# Patient Record
Sex: MALE | Race: WHITE | NOT HISPANIC OR LATINO | Employment: FULL TIME | URBAN - METROPOLITAN AREA
[De-identification: names, ages, dates, MRNs, and addresses within clinical notes are randomized per-mention and may not be internally consistent; named-entity substitution may affect disease eponyms.]

---

## 2023-01-16 PROBLEM — L98.9 LESION OF SKIN OF FACE: Status: ACTIVE | Noted: 2023-01-16

## 2023-01-16 PROBLEM — E66.3 OVERWEIGHT WITH BODY MASS INDEX (BMI) OF 28 TO 28.9 IN ADULT: Status: ACTIVE | Noted: 2023-01-16

## 2023-01-16 PROBLEM — I25.10 CAD (CORONARY ARTERY DISEASE): Status: ACTIVE | Noted: 2023-01-16

## 2023-01-16 PROBLEM — K62.89 PROCTALGIA: Status: ACTIVE | Noted: 2023-01-16

## 2023-01-16 PROBLEM — R93.1 ELEVATED CORONARY ARTERY CALCIUM SCORE: Status: ACTIVE | Noted: 2023-01-16

## 2023-01-16 PROBLEM — S80.852A: Status: ACTIVE | Noted: 2023-01-16

## 2023-01-16 PROBLEM — E78.5 HYPERLIPIDEMIA: Status: ACTIVE | Noted: 2023-01-16

## 2023-01-16 PROBLEM — K60.2 ANAL FISSURE: Status: ACTIVE | Noted: 2023-01-16

## 2023-01-16 RX ORDER — ROSUVASTATIN CALCIUM 40 MG/1
40 TABLET, COATED ORAL DAILY
COMMUNITY
End: 2023-11-27 | Stop reason: SDUPTHER

## 2023-01-16 RX ORDER — ASPIRIN 81 MG/1
81 TABLET ORAL DAILY
COMMUNITY
End: 2023-11-27 | Stop reason: SDUPTHER

## 2023-01-16 RX ORDER — EZETIMIBE 10 MG/1
10 TABLET ORAL DAILY
COMMUNITY
End: 2023-11-27 | Stop reason: SDUPTHER

## 2023-02-21 ENCOUNTER — TELEPHONE (OUTPATIENT)
Dept: PRIMARY CARE | Facility: CLINIC | Age: 58
End: 2023-02-21
Payer: COMMERCIAL

## 2023-03-06 ENCOUNTER — LAB (OUTPATIENT)
Dept: LAB | Facility: LAB | Age: 58
End: 2023-03-06
Payer: COMMERCIAL

## 2023-03-06 ENCOUNTER — OFFICE VISIT (OUTPATIENT)
Dept: PRIMARY CARE | Facility: CLINIC | Age: 58
End: 2023-03-06
Payer: COMMERCIAL

## 2023-03-06 VITALS
SYSTOLIC BLOOD PRESSURE: 138 MMHG | OXYGEN SATURATION: 98 % | WEIGHT: 195.6 LBS | HEIGHT: 70 IN | TEMPERATURE: 97.8 F | DIASTOLIC BLOOD PRESSURE: 70 MMHG | HEART RATE: 67 BPM | BODY MASS INDEX: 28 KG/M2

## 2023-03-06 DIAGNOSIS — Z00.00 WELL ADULT EXAM: Primary | ICD-10-CM

## 2023-03-06 DIAGNOSIS — E66.3 OVERWEIGHT WITH BODY MASS INDEX (BMI) OF 28 TO 28.9 IN ADULT: ICD-10-CM

## 2023-03-06 DIAGNOSIS — E78.41 ELEVATED LIPOPROTEIN(A): ICD-10-CM

## 2023-03-06 DIAGNOSIS — I25.119 CORONARY ARTERY DISEASE WITH ANGINA PECTORIS, UNSPECIFIED VESSEL OR LESION TYPE, UNSPECIFIED WHETHER NATIVE OR TRANSPLANTED HEART (CMS-HCC): ICD-10-CM

## 2023-03-06 DIAGNOSIS — Z00.00 WELL ADULT EXAM: ICD-10-CM

## 2023-03-06 LAB
ALANINE AMINOTRANSFERASE (SGPT) (U/L) IN SER/PLAS: 31 U/L (ref 10–52)
ALBUMIN (G/DL) IN SER/PLAS: 4.5 G/DL (ref 3.4–5)
ALKALINE PHOSPHATASE (U/L) IN SER/PLAS: 44 U/L (ref 33–120)
ANION GAP IN SER/PLAS: 11 MMOL/L (ref 10–20)
ASPARTATE AMINOTRANSFERASE (SGOT) (U/L) IN SER/PLAS: 23 U/L (ref 9–39)
BASOPHILS (10*3/UL) IN BLOOD BY AUTOMATED COUNT: 0.02 X10E9/L (ref 0–0.1)
BASOPHILS/100 LEUKOCYTES IN BLOOD BY AUTOMATED COUNT: 0.3 % (ref 0–2)
BILIRUBIN TOTAL (MG/DL) IN SER/PLAS: 0.7 MG/DL (ref 0–1.2)
CALCIUM (MG/DL) IN SER/PLAS: 9.8 MG/DL (ref 8.6–10.6)
CARBON DIOXIDE, TOTAL (MMOL/L) IN SER/PLAS: 29 MMOL/L (ref 21–32)
CHLORIDE (MMOL/L) IN SER/PLAS: 102 MMOL/L (ref 98–107)
CHOLESTEROL (MG/DL) IN SER/PLAS: 149 MG/DL (ref 0–199)
CHOLESTEROL IN HDL (MG/DL) IN SER/PLAS: 48.3 MG/DL
CHOLESTEROL/HDL RATIO: 3.1
CREATININE (MG/DL) IN SER/PLAS: 0.87 MG/DL (ref 0.5–1.3)
EOSINOPHILS (10*3/UL) IN BLOOD BY AUTOMATED COUNT: 0.08 X10E9/L (ref 0–0.7)
EOSINOPHILS/100 LEUKOCYTES IN BLOOD BY AUTOMATED COUNT: 1.2 % (ref 0–6)
ERYTHROCYTE DISTRIBUTION WIDTH (RATIO) BY AUTOMATED COUNT: 12.8 % (ref 11.5–14.5)
ERYTHROCYTE MEAN CORPUSCULAR HEMOGLOBIN CONCENTRATION (G/DL) BY AUTOMATED: 32.1 G/DL (ref 32–36)
ERYTHROCYTE MEAN CORPUSCULAR VOLUME (FL) BY AUTOMATED COUNT: 89 FL (ref 80–100)
ERYTHROCYTES (10*6/UL) IN BLOOD BY AUTOMATED COUNT: 4.8 X10E12/L (ref 4.5–5.9)
GFR MALE: >90 ML/MIN/1.73M2
GLUCOSE (MG/DL) IN SER/PLAS: 115 MG/DL (ref 74–99)
HEMATOCRIT (%) IN BLOOD BY AUTOMATED COUNT: 42.7 % (ref 41–52)
HEMOGLOBIN (G/DL) IN BLOOD: 13.7 G/DL (ref 13.5–17.5)
IMMATURE GRANULOCYTES/100 LEUKOCYTES IN BLOOD BY AUTOMATED COUNT: 0.3 % (ref 0–0.9)
LDL: 66 MG/DL (ref 0–99)
LEUKOCYTES (10*3/UL) IN BLOOD BY AUTOMATED COUNT: 6.5 X10E9/L (ref 4.4–11.3)
LYMPHOCYTES (10*3/UL) IN BLOOD BY AUTOMATED COUNT: 2.51 X10E9/L (ref 1.2–4.8)
LYMPHOCYTES/100 LEUKOCYTES IN BLOOD BY AUTOMATED COUNT: 38.9 % (ref 13–44)
MONOCYTES (10*3/UL) IN BLOOD BY AUTOMATED COUNT: 0.53 X10E9/L (ref 0.1–1)
MONOCYTES/100 LEUKOCYTES IN BLOOD BY AUTOMATED COUNT: 8.2 % (ref 2–10)
NEUTROPHILS (10*3/UL) IN BLOOD BY AUTOMATED COUNT: 3.29 X10E9/L (ref 1.2–7.7)
NEUTROPHILS/100 LEUKOCYTES IN BLOOD BY AUTOMATED COUNT: 51.1 % (ref 40–80)
NRBC (PER 100 WBCS) BY AUTOMATED COUNT: 0 /100 WBC (ref 0–0)
PLATELETS (10*3/UL) IN BLOOD AUTOMATED COUNT: 218 X10E9/L (ref 150–450)
POTASSIUM (MMOL/L) IN SER/PLAS: 4.3 MMOL/L (ref 3.5–5.3)
PROSTATE SPECIFIC AG (NG/ML) IN SER/PLAS: 3.93 NG/ML (ref 0–4)
PROTEIN TOTAL: 6.6 G/DL (ref 6.4–8.2)
SODIUM (MMOL/L) IN SER/PLAS: 138 MMOL/L (ref 136–145)
TRIGLYCERIDE (MG/DL) IN SER/PLAS: 172 MG/DL (ref 0–149)
UREA NITROGEN (MG/DL) IN SER/PLAS: 16 MG/DL (ref 6–23)
VLDL: 34 MG/DL (ref 0–40)

## 2023-03-06 PROCEDURE — 99396 PREV VISIT EST AGE 40-64: CPT | Performed by: FAMILY MEDICINE

## 2023-03-06 PROCEDURE — 85025 COMPLETE CBC W/AUTO DIFF WBC: CPT

## 2023-03-06 PROCEDURE — 84153 ASSAY OF PSA TOTAL: CPT

## 2023-03-06 PROCEDURE — 80053 COMPREHEN METABOLIC PANEL: CPT

## 2023-03-06 PROCEDURE — 3008F BODY MASS INDEX DOCD: CPT | Performed by: FAMILY MEDICINE

## 2023-03-06 PROCEDURE — 80061 LIPID PANEL: CPT

## 2023-03-06 PROCEDURE — 36415 COLL VENOUS BLD VENIPUNCTURE: CPT

## 2023-03-06 ASSESSMENT — ENCOUNTER SYMPTOMS
PSYCHIATRIC NEGATIVE: 1
CARDIOVASCULAR NEGATIVE: 1
RESPIRATORY NEGATIVE: 1
CONSTITUTIONAL NEGATIVE: 1
EYES NEGATIVE: 1
ENDOCRINE NEGATIVE: 1
NEUROLOGICAL NEGATIVE: 1
HEMATOLOGIC/LYMPHATIC NEGATIVE: 1
GASTROINTESTINAL NEGATIVE: 1

## 2023-03-06 ASSESSMENT — PAIN SCALES - GENERAL: PAINLEVEL: 0-NO PAIN

## 2023-03-06 NOTE — PROGRESS NOTES
"Subjective   Patient ID: Rajiv Melvin is a 57 y.o. male who presents for No chief complaint on file..    HPI     Review of Systems   Constitutional: Negative.    HENT: Negative.     Eyes: Negative.    Respiratory: Negative.     Cardiovascular: Negative.         Dr Chacon cardiac   Gastrointestinal: Negative.    Endocrine: Negative.    Genitourinary: Negative.    Skin: Negative.    Neurological: Negative.    Hematological: Negative.    Psychiatric/Behavioral: Negative.         Objective   /70 (BP Location: Left arm)   Pulse 67   Temp 36.6 °C (97.8 °F) (Temporal)   Ht 1.778 m (5' 10\")   Wt 88.7 kg (195 lb 9.6 oz)   SpO2 98%   BMI 28.07 kg/m²     Physical Exam  HENT:      Right Ear: Tympanic membrane normal.      Left Ear: Tympanic membrane normal.   Eyes:      Pupils: Pupils are equal, round, and reactive to light.   Cardiovascular:      Rate and Rhythm: Normal rate.   Pulmonary:      Effort: Pulmonary effort is normal.      Breath sounds: Normal breath sounds.   Abdominal:      General: Abdomen is flat.      Palpations: Abdomen is soft.   Genitourinary:     Prostate: Normal.   Musculoskeletal:         General: Normal range of motion.      Cervical back: Normal range of motion.   Skin:     General: Skin is warm.   Neurological:      General: No focal deficit present.      Mental Status: He is alert.   Psychiatric:         Mood and Affect: Mood normal.         Assessment/Plan   Problem List Items Addressed This Visit          Circulatory    CAD (coronary artery disease)       Endocrine/Metabolic    Overweight with body mass index (BMI) of 28 to 28.9 in adult       Other    Hyperlipidemia     Other Visit Diagnoses       Well adult exam    -  Primary    Relevant Orders    Comprehensive Metabolic Panel    CBC and Auto Differential    Lipid Panel    PSA               "

## 2023-09-20 ENCOUNTER — OFFICE VISIT (OUTPATIENT)
Dept: PRIMARY CARE | Facility: CLINIC | Age: 58
End: 2023-09-20
Payer: COMMERCIAL

## 2023-09-20 VITALS
DIASTOLIC BLOOD PRESSURE: 78 MMHG | OXYGEN SATURATION: 98 % | WEIGHT: 194.8 LBS | SYSTOLIC BLOOD PRESSURE: 122 MMHG | HEART RATE: 64 BPM | BODY MASS INDEX: 27.95 KG/M2

## 2023-09-20 DIAGNOSIS — M25.511 CHRONIC RIGHT SHOULDER PAIN: Primary | ICD-10-CM

## 2023-09-20 DIAGNOSIS — G89.29 CHRONIC RIGHT SHOULDER PAIN: Primary | ICD-10-CM

## 2023-09-20 PROBLEM — R94.39 ABNORMAL STRESS TEST: Status: ACTIVE | Noted: 2023-09-20

## 2023-09-20 PROCEDURE — 99213 OFFICE O/P EST LOW 20 MIN: CPT | Performed by: STUDENT IN AN ORGANIZED HEALTH CARE EDUCATION/TRAINING PROGRAM

## 2023-09-20 PROCEDURE — 3008F BODY MASS INDEX DOCD: CPT | Performed by: STUDENT IN AN ORGANIZED HEALTH CARE EDUCATION/TRAINING PROGRAM

## 2023-09-20 PROCEDURE — 1036F TOBACCO NON-USER: CPT | Performed by: STUDENT IN AN ORGANIZED HEALTH CARE EDUCATION/TRAINING PROGRAM

## 2023-09-20 ASSESSMENT — ENCOUNTER SYMPTOMS: DEPRESSION: 0

## 2023-09-20 ASSESSMENT — PAIN SCALES - GENERAL: PAINLEVEL: 0-NO PAIN

## 2023-09-20 NOTE — PROGRESS NOTES
Subjective   Patient ID: Rajiv Melvin is a 58 y.o. male who presents for Shoulder Pain (Right shoulder pain.).    HPI comes in discuss chronic right shoulder pain worse over the past year    Review of Systems  Constitutional: NO F, chills, or sweats  Eyes: no blurred vision or visual disturbance  ENT: no hearing loss, no congestion, no nasal discharge, no hoarseness and no sore throat.   Cardiovascular: no chest pain, no edema, no palps and no syncope.   Respiratory: no cough,no s.o.b. and no wheezing  Gastrointestinal: no abdominal pain, No C/D no N/V, no blood in stools  Genitourinary: no dysuria, no change in urinary frequency, no urinary hesitancy and no feelings of urinary urgency.   Musculoskeletal: Chronic right shoulder pain  Objective   /78 (BP Location: Right arm, Patient Position: Sitting, BP Cuff Size: Large adult)   Pulse 64   Wt 88.4 kg (194 lb 12.8 oz)   SpO2 98%   BMI 27.95 kg/m²     Physical Exam  Shoulder-pain with apprehension test of the right shoulder otherwise full active passive range of motion positive mild crepitus  Assessment/Plan     1.  Chronic right shoulder pain slowly worsening in recent years.  Exam is consistent with an anterior labral tear.  Would advise on conservative management first, get a baseline x-ray.  Start physical therapy.  If no improvement with physical therapy over the next several weeks we would consider MRI or orthopedic referral.

## 2023-10-09 ENCOUNTER — EVALUATION (OUTPATIENT)
Dept: PHYSICAL THERAPY | Facility: CLINIC | Age: 58
End: 2023-10-09
Payer: COMMERCIAL

## 2023-10-09 DIAGNOSIS — G89.29 CHRONIC RIGHT SHOULDER PAIN: ICD-10-CM

## 2023-10-09 DIAGNOSIS — M25.511 CHRONIC RIGHT SHOULDER PAIN: ICD-10-CM

## 2023-10-09 PROCEDURE — 97161 PT EVAL LOW COMPLEX 20 MIN: CPT | Mod: GP | Performed by: PHYSICAL THERAPIST

## 2023-10-09 PROCEDURE — 97110 THERAPEUTIC EXERCISES: CPT | Mod: GP | Performed by: PHYSICAL THERAPIST

## 2023-10-09 ASSESSMENT — ENCOUNTER SYMPTOMS
LOSS OF SENSATION IN FEET: 0
DEPRESSION: 0
OCCASIONAL FEELINGS OF UNSTEADINESS: 0

## 2023-10-09 ASSESSMENT — PATIENT HEALTH QUESTIONNAIRE - PHQ9
SUM OF ALL RESPONSES TO PHQ9 QUESTIONS 1 AND 2: 0
2. FEELING DOWN, DEPRESSED OR HOPELESS: NOT AT ALL
1. LITTLE INTEREST OR PLEASURE IN DOING THINGS: NOT AT ALL

## 2023-10-09 NOTE — LETTER
October 9, 2023     Patient: Rajiv Melvin   YOB: 1965   Date of Visit: 10/9/2023       To Whom It May Concern:    It is my medical opinion that Rajiv Melvin {Work release (duty restriction):39523}.    If you have any questions or concerns, please don't hesitate to call.         Sincerely,        Micaela Mclaughlin, PT    CC: No Recipients

## 2023-10-09 NOTE — LETTER
October 9, 2023     Patient: Rajiv Melvin   YOB: 1965   Date of Visit: 10/9/2023       To Whom it May Concern:    Rajiv Melvin was seen in my clinic on 10/9/2023. He {Return to school/sport:52116}.    If you have any questions or concerns, please don't hesitate to call.         Sincerely,          Micaela Mclaughlin, PT        CC: No Recipients

## 2023-10-09 NOTE — PROGRESS NOTES
Physical Therapy    Physical Therapy Evaluation and Treatment      Patient Name: Rajiv Melvin  MRN: [id number]  Today's Date: 10/9/2023  Time Calculation  Start Time: 1430  Stop Time: 1515  Time Calculation (min): 45 min          Current Problem:   1. Chronic right shoulder pain  Referral to Physical Therapy    Follow Up In Physical Therapy          Referring provider: Dr. Alden Funes    Insurance:  Visit #: 1  Approved number of visits: ?  Auth Required: NO      Precautions: NONE    Assessment: Pt is 58 y.o. male c/o insidious onset right shoulder pain for over one year which is getting progressively worse and interfering with patients functional movement and quality of sleep. Pt demonstrates decreased right shoulder A/PROM, decreased right shoulder and scapular stabilizer strength with painful MMT, round shoulder posture, and painful end ranges all passive and active ROM right shoulder.  Signs and symptoms consistent with referring diagnosis with possible subacromial impingement.  Pt is a good candidate for skilled PT intervention to address above impairments and improve functional mobility and QOL.     Treatment today:   1. Initial evaluation   2. Pt ed on diagnosis, prognosis, goals of PT, expectations   3. HEP (see below) ed on normal vs abnormal response, education on holding off on push ups, DB flies, pull ups, etc.     Access Code: [email or web address]  URL: [email or web address]  Date: 10/09/2023  Prepared by: Micaela Mclaughlin    Exercises  - Supine Shoulder Flexion AAROM with Dowel  - 2 x daily - 7 x weekly - 2 sets - 10 reps  - Supine Shoulder External Rotation in 45 Degrees Abduction AAROM with Dowel  - 2 x daily - 7 x weekly - 2 sets - 10 reps  - Standing Bilateral Low Shoulder Row with Anchored Resistance  - 2 x daily - 7 x weekly - 2 sets - 10 reps  - Shoulder Extension with Resistance  - 2 x daily - 7 x weekly - 2 sets - 10 reps  - Shoulder External Rotation with Resistance  - 2 x daily - 7 x  weekly - 2 sets - 10 reps    Plan:  Interventions: Biofeedback, Cryotherapy, Dry Needling, Education/Instruction, Electrical Stimulation, Home Program, Hot Pack, Kinesiotaping, Manual Therapy, Neuromuscular Re-education, Self Care/Home Management, Therapeutic Exercises, Ultrasound, Mechanical Traction, Aquatic Therapy, Vasopneumatic device with cold  Frequency and Duration: 1-2xweek for 4-6 weeks   Rehab Potential: Good  Plan of Care Agreement: Patient      Goals:  Pt will meet the following goals in 8 weeks  Pt will report <0-2/10 pain with ADL's and functional mobility.  Pt will be independent with HEP at least 3 days per week to maintain gains made in therapy.   3. Pt will increase right shoulder AROM > 160 degrees flexion and abduction and reach HBB/head equal to left, to ease performance of dressing, ADL's such as putting away dishes, playing sports, etc.  4. Pt will increase right shoulder strength 5/5 without pain to return to full exercise routine, pushing/pulling/lifting needed for work tasks, yard work, etc.  5. QUICK DASH < /= 4.55 demonstrating improved function and decreased disability   6. Pt will sleep full 6 hours without waking due to shoulder pain      Subjective:  Chief complaint: Chronic right shoulder pain which began over a year ago but progressively getting worse.  No specific EMMANUEL.     Relevant PMH: right hand dominant    Pain:  Current: 0  At worst: 5  Makes better: rest but did not relieve pain  Makes worse: lying on right shoulder, reaching out to side, overhead, behind back, throwing  Type: dull achy, really sharp pain with activities  Location: anterior lateral right shoulder    Sleep: cannot sleep on right side    PLOF: sports, working out, ADL's with pain    Work: sales, full time     Exercise: pull ups, free, push ups, treadmill, elliptical. Has not done arm work for the past couple weeks.     Pt goals for PT: relieve pain, return to tennis, volleyball, working  out    Objective:    Posture: Round shoulders, fwd head    Strength (MMT):  *pain upon resitance  Shoulder flexion: R 4*, L 5  Shoulder abduction: R 4*, L 5  Shoulder ER: R 5, L 5  Shoulder IR: R 5, L 5    Prone right shoulder  Low trap 3+/5 painful at end range  Mid trap 4+/5  Shoulder ext 5/5    Range of Motion (degrees of motion):  AROM  Pain with all end range movement right shoulder  Shoulder flexion: R 132, L 180   Shoulder abduction: R 120, L 180  Shoulder ER: R T3, L T6 (reach behind head)  Shoulder IR: R glutes, L T10 (reach behind back)    PROM Right shoulder  Pain at all end ranges  Flexion 135  Abd 140  ER 60  IR 55    Palpation/other:  negative apprehension test, no tenderness to palpation of rotator cuff muscles, biceps tendons, pec minor or major  Decreased right GH joint mobility posterior and inferior    Outcome Measures:  Other Measures  Disability of Arm Shoulder Hand (DASH): 18.18

## 2023-10-20 ENCOUNTER — TREATMENT (OUTPATIENT)
Dept: PHYSICAL THERAPY | Facility: CLINIC | Age: 58
End: 2023-10-20
Payer: COMMERCIAL

## 2023-10-20 ENCOUNTER — APPOINTMENT (OUTPATIENT)
Dept: PHYSICAL THERAPY | Facility: CLINIC | Age: 58
End: 2023-10-20
Payer: COMMERCIAL

## 2023-10-20 DIAGNOSIS — M25.511 RIGHT SHOULDER PAIN: Primary | ICD-10-CM

## 2023-10-20 DIAGNOSIS — M25.511 CHRONIC RIGHT SHOULDER PAIN: ICD-10-CM

## 2023-10-20 DIAGNOSIS — G89.29 CHRONIC RIGHT SHOULDER PAIN: ICD-10-CM

## 2023-10-20 PROCEDURE — 97140 MANUAL THERAPY 1/> REGIONS: CPT | Mod: GP

## 2023-10-20 PROCEDURE — 97110 THERAPEUTIC EXERCISES: CPT | Mod: GP

## 2023-10-20 NOTE — PROGRESS NOTES
Physical Therapy    Physical Therapy Treatment    Patient Name: Rajiv Melvin  MRN: [id number]  Today's Date: 10/20/2023  Time Calculation  Start Time: 1413  Stop Time: 1458  Time Calculation (min): 45 min    Visit: 2  Visits Approved: 12  Authorization needed: no  Certification dates: none    Goals:  Pt will meet the following goals in 8 weeks  Pt will report <0-2/10 pain with ADL's and functional mobility.  Pt will be independent with HEP at least 3 days per week to maintain gains made in therapy.   3.    Pt will increase right shoulder AROM > 160 degrees flexion and abduction and reach HBB/head equal to left, to ease performance of dressing, ADL's such as putting away dishes, playing sports, etc.  4.    Pt will increase right shoulder strength 5/5 without pain to return to full exercise routine, pushing/pulling/lifting needed for work tasks, yard work, etc.  5. QUICK DASH < /= 4.55 demonstrating improved function and decreased disability   6. Pt will sleep full 6 hours without waking due to shoulder pain    Assessment:   Pt with increased hypomobility in R scapula. Palpable band of tightness over anterior shoulder with MFR     Plan:   Continue scapular stability, building HEP   Access Code: [email or web address]  URL: [email or web address]  Date: 10/20/2023  Prepared by: Arelis Weldon    Exercises  - Prone Scapular Retraction  - 1 x daily - 7 x weekly - 2 sets - 10 reps - 5 seconds  hold  - Quadruped Alternating Arm Lift  - 1 x daily - 7 x weekly - 2 sets - 10 reps - 5 seconds  hold  Current Problem  1. Right shoulder pain        2. Chronic right shoulder pain  Follow Up In Physical Therapy          Subjective   General  Pt reports not much improvement still rated 5/10 with quick velocity movements. He has been taking it easy at the gym.    Precautions  none    Pain   5/10 in RUE     Objective        Outcome Measures:   Disability of Arm Shoulder Hand (DASH): 18.18      Treatments:    Therapeutic Exercise:      UBE x 6 mins, 3 minutes forward and retro  Quadruped shoulder flexion 2 x 10   Child's pose 30 seconds x 30  Prone scapular retraction 5 second hold 3 x 10   Ball on wall CW/CCW x 1 minute x 3   Rhythmic stabilization rotation, supine @ 90, sidelying @ 90  Discussed gym modifications     Manual:  Prone thoracic mobilizations   Sidelying scapular mobilizations RUE  Sidelying TPR and MFR to anterior shoulder RUE      OP EDUCATION:

## 2023-10-24 ENCOUNTER — APPOINTMENT (OUTPATIENT)
Dept: PHYSICAL THERAPY | Facility: CLINIC | Age: 58
End: 2023-10-24
Payer: COMMERCIAL

## 2023-10-25 ENCOUNTER — TREATMENT (OUTPATIENT)
Dept: PHYSICAL THERAPY | Facility: CLINIC | Age: 58
End: 2023-10-25
Payer: COMMERCIAL

## 2023-10-25 DIAGNOSIS — G89.29 CHRONIC RIGHT SHOULDER PAIN: ICD-10-CM

## 2023-10-25 DIAGNOSIS — M25.511 CHRONIC RIGHT SHOULDER PAIN: ICD-10-CM

## 2023-10-25 PROCEDURE — 97140 MANUAL THERAPY 1/> REGIONS: CPT | Mod: GP

## 2023-10-25 PROCEDURE — 97110 THERAPEUTIC EXERCISES: CPT | Mod: GP

## 2023-10-25 NOTE — PROGRESS NOTES
"Physical Therapy    Physical Therapy Treatment    Patient Name: Rajiv Melvin  MRN: [id number]  Today's Date: 10/25/2023  Time Calculation  Start Time: 1500  Stop Time: 1545  Time Calculation (min): 45 min    Visit: 3  Visits Approved: 12  Authorization needed: no  Certification dates: none    Assessment:   Pt challenged this session with noted fatigue throughout session of RTC and scapular stabilizers. HEP was updated to challenge pt and facilitate his active lifestyle. Pt is appropriate for continued skilled PT services to address remaining impairments.    Plan:   Continue with scapular stabilizer and RTC strengthening, progress as tolerated. Consider moving towards discharge.     Current Problem  1. Chronic right shoulder pain  Follow Up In Physical Therapy          Subjective   General  Pt reports no pain currently, but at worst 2-3/10. He has been avoiding quick and painful mvmts. Compliant with HEP.    Precautions  none    Pain   0/10 currently    Objective      Treatments:  Therapeutic Exercise:  UBE x 6 mins, 3 minutes forward and retro  Child's pose at bar 30\" x 3  Ball on wall CW/CCW 1\" x 2 R  B ER with wall slide red TB x 10 x 2  Push up with plus on wall x 10 x 2   3 way shoulder resisted red TB at wall x 10 x 2 R/L  B rows 32.5# x 10 x 2   B face pulls 32.5# x 10 x 2  Arm bar 8# DB x 5 R, 10# DB x 10 x 2 R/L   Quadruped alt shoulder flex on black side of BOSU x 10 x 2 R/L   Plank with shoulder taps x 10 x 2 R/L    Education:  Pt educated on potential labral tear reduces dynamic shoulder joint stability, so strengthening RTC and scapular stabilizers will provide muscular dynamic stability. HEP updated - pt recommended to perform arm bar with kettlebell with weight up for challenge and increase need for shoulder stability.           "

## 2023-10-30 ENCOUNTER — TREATMENT (OUTPATIENT)
Dept: PHYSICAL THERAPY | Facility: CLINIC | Age: 58
End: 2023-10-30
Payer: COMMERCIAL

## 2023-10-30 DIAGNOSIS — M25.511 CHRONIC RIGHT SHOULDER PAIN: ICD-10-CM

## 2023-10-30 DIAGNOSIS — G89.29 CHRONIC RIGHT SHOULDER PAIN: ICD-10-CM

## 2023-10-30 PROCEDURE — 97110 THERAPEUTIC EXERCISES: CPT | Mod: GP | Performed by: PHYSICAL THERAPIST

## 2023-10-30 PROCEDURE — 97112 NEUROMUSCULAR REEDUCATION: CPT | Mod: GP | Performed by: PHYSICAL THERAPIST

## 2023-10-30 NOTE — PROGRESS NOTES
"Physical Therapy    Physical Therapy Treatment    Patient Name: Rajiv Melvin  MRN: [id number]  Today's Date: 10/30/2023  Time Calculation  Start Time: 1600  Stop Time: 1645  Time Calculation (min): 45 min    Visit: 4  Visits Approved: 12  Authorization needed: no  Certification dates: none    Assessment:   Pt challenged this session with noted fatigue throughout session of RTC and scapular stabilizers. Pt c/o increased pain 2/10 during plank hand slides. HEP was reviewed and updated. Pt will work on home program for 3 weeks, then consider discharge depending on sx.   Plan:   Check in on progress in 3 weeks and consider discharge.     Current Problem  1. Chronic right shoulder pain  Follow Up In Physical Therapy    Follow Up In Physical Therapy          Subjective   General  Pt states that his R shoulder pain has decreased and feels he is getting better. Compliant with HEP and challenged with exercises.     Precautions  none    Pain   0/10 currently    Objective      Treatments:  Access code: [email or web address]  Therapeutic Exercise:  UBE x 6 mins, 3 minutes forward and retro - not performed this date   Ball on wall CW/CCW 1\" x 2 R  B ER with wall slide red TB x 10 , green TB x 10   3 way shoulder resisted red TB at wall x 10 x 2 R/L  Unilateral ER at ABD 90 with rotation x 10 x 2 R/L  Prone Y's and T's x 10 x 2   Plank with shoulder taps x 10 x 2 R/L  Plank with hand slide x 10 x 2 R/L   High to low plank x 10 x 2 R/L     NM Re-Ed:  Rhythmic stabilization R shoulder flexed to 90 supine - up/down, side/side, random     Education:  HEP updated.           "

## 2023-11-20 ENCOUNTER — TELEPHONE (OUTPATIENT)
Dept: PRIMARY CARE | Facility: CLINIC | Age: 58
End: 2023-11-20
Payer: COMMERCIAL

## 2023-11-20 ENCOUNTER — APPOINTMENT (OUTPATIENT)
Dept: PHYSICAL THERAPY | Facility: CLINIC | Age: 58
End: 2023-11-20
Payer: COMMERCIAL

## 2023-11-20 DIAGNOSIS — G89.29 CHRONIC RIGHT SHOULDER PAIN: Primary | ICD-10-CM

## 2023-11-20 DIAGNOSIS — M25.511 CHRONIC RIGHT SHOULDER PAIN: Primary | ICD-10-CM

## 2023-11-20 NOTE — TELEPHONE ENCOUNTER
Pt reached out through [employer] for an appt request but this is what the pt said:      Have done PT but still have issues with shoulder.  Can we schedule an MRI?       Does the pt need an appt or can an MRI be placed

## 2023-11-27 DIAGNOSIS — E78.41 ELEVATED LIPOPROTEIN(A): Primary | ICD-10-CM

## 2023-11-27 DIAGNOSIS — I25.119 CORONARY ARTERY DISEASE WITH ANGINA PECTORIS, UNSPECIFIED VESSEL OR LESION TYPE, UNSPECIFIED WHETHER NATIVE OR TRANSPLANTED HEART (CMS-HCC): ICD-10-CM

## 2023-11-27 RX ORDER — ROSUVASTATIN CALCIUM 40 MG/1
40 TABLET, COATED ORAL DAILY
Qty: 90 TABLET | Refills: 1 | Status: SHIPPED | OUTPATIENT
Start: 2023-11-27 | End: 2024-03-08 | Stop reason: SDUPTHER

## 2023-11-27 RX ORDER — EZETIMIBE 10 MG/1
10 TABLET ORAL DAILY
Qty: 90 TABLET | Refills: 1 | Status: SHIPPED | OUTPATIENT
Start: 2023-11-27 | End: 2024-02-27 | Stop reason: SDUPTHER

## 2023-11-27 RX ORDER — ASPIRIN 81 MG/1
81 TABLET ORAL DAILY
Qty: 90 TABLET | Refills: 1 | Status: SHIPPED | OUTPATIENT
Start: 2023-11-27 | End: 2024-03-08 | Stop reason: SDUPTHER

## 2023-12-05 ENCOUNTER — HOSPITAL ENCOUNTER (OUTPATIENT)
Dept: RADIOLOGY | Facility: CLINIC | Age: 58
Discharge: HOME | End: 2023-12-05
Payer: COMMERCIAL

## 2023-12-05 DIAGNOSIS — M25.511 CHRONIC RIGHT SHOULDER PAIN: ICD-10-CM

## 2023-12-05 DIAGNOSIS — G89.29 CHRONIC RIGHT SHOULDER PAIN: Primary | ICD-10-CM

## 2023-12-05 DIAGNOSIS — M25.511 CHRONIC RIGHT SHOULDER PAIN: Primary | ICD-10-CM

## 2023-12-05 DIAGNOSIS — G89.29 CHRONIC RIGHT SHOULDER PAIN: ICD-10-CM

## 2023-12-05 PROCEDURE — 73221 MRI JOINT UPR EXTREM W/O DYE: CPT | Mod: RIGHT SIDE | Performed by: RADIOLOGY

## 2023-12-05 PROCEDURE — 73221 MRI JOINT UPR EXTREM W/O DYE: CPT | Mod: RT

## 2023-12-07 ENCOUNTER — OFFICE VISIT (OUTPATIENT)
Dept: ORTHOPEDIC SURGERY | Facility: CLINIC | Age: 58
End: 2023-12-07
Payer: COMMERCIAL

## 2023-12-07 VITALS — WEIGHT: 190 LBS | BODY MASS INDEX: 27.2 KG/M2 | HEIGHT: 70 IN

## 2023-12-07 DIAGNOSIS — S46.011A ROTATOR CUFF STRAIN, RIGHT, INITIAL ENCOUNTER: ICD-10-CM

## 2023-12-07 DIAGNOSIS — M25.511 CHRONIC RIGHT SHOULDER PAIN: Primary | ICD-10-CM

## 2023-12-07 DIAGNOSIS — G89.29 CHRONIC RIGHT SHOULDER PAIN: Primary | ICD-10-CM

## 2023-12-07 PROCEDURE — 3008F BODY MASS INDEX DOCD: CPT | Performed by: ORTHOPAEDIC SURGERY

## 2023-12-07 PROCEDURE — 99204 OFFICE O/P NEW MOD 45 MIN: CPT | Performed by: ORTHOPAEDIC SURGERY

## 2023-12-07 PROCEDURE — 1036F TOBACCO NON-USER: CPT | Performed by: ORTHOPAEDIC SURGERY

## 2023-12-07 RX ORDER — SODIUM CHLORIDE, SODIUM LACTATE, POTASSIUM CHLORIDE, CALCIUM CHLORIDE 600; 310; 30; 20 MG/100ML; MG/100ML; MG/100ML; MG/100ML
100 INJECTION, SOLUTION INTRAVENOUS CONTINUOUS
Status: CANCELLED | OUTPATIENT
Start: 2024-01-19

## 2023-12-07 NOTE — LETTER
December 7, 2023     Noel Murphy DO  [address]  [address]  [facility] [address]    Patient: Rajiv Melvin   YOB: 1965   Date of Visit: 12/7/2023       Dear Dr. Noel Murphy DO:    Thank you for referring Rajiv Melvin to me for evaluation. Below are my notes for this consultation.  If you have questions, please do not hesitate to call me. I look forward to following your patient along with you.       Sincerely,     Steve Dunlap MD      CC: Alden Funes DO  ______________________________________________________________________________________    58-year-old is seen with right shoulder pain.  He has been having persistent severe sharp shooting pain in the right shoulder over the last 5 months.  He works in industrial sales.  He does repetitive activities with the shoulder over the years.  He has had physical therapy without improvement.  He is right-hand dominant.  He has difficulty with overhead reaching and lifting activities.  Past medical social family history review of systems reviewed and updated on the information sheet.  Pleasant and no acute distress.  Right shoulder forward flexion 160°. No effusion or instability. There is positive Neer and Link impingement. There is subacromial crepitus and tenderness around the subacromial space.  Mild biceps tenderness. No acromioclavicular tenderness. Rotator cuff strength is decreased and there is discomfort with strength testing. Left shoulder forward flexion 180°. No effusion or instability. No impingement or crepitus or tenderness involving the subacromial space. No biceps or acromioclavicular tenderness. There is intact rotator cuff strength. There is adequate range of motion of the cervical spine without pain. Both upper extremities are well perfused, skin is intact and muscle tone is adequate. Elbow flexion and extension and wrist flexion and extension strength are intact.    Multiple x-ray views of the right  shoulder are personally reviewed and there is no acute bony abnormality.    MRI of the right shoulder was personally reviewed and there is a full-thickness tear involving the supraspinatus tendon.  There are chondral changes involving the glenohumeral joint and irregularity involving the labrum.  There is subacromial bursitis and a glenohumeral effusion.    A detailed discussion about the rotator cuff tear was done.  Treatment options including no treatment were reviewed and the decision was made to proceed with a right shoulder arthroscopy with rotator cuff repair and extensive debridement and subacromial decompression.  The surgery and postoperative course were reviewed in detail.  Risks including but not limited to infection thromboembolus neurovascular injury fracture medical problems stiffness and repair not healing were discussed and he understands this and has elected to proceed.  He will avoid aggravating activities in the meantime.

## 2023-12-08 PROBLEM — S46.011A ROTATOR CUFF STRAIN, RIGHT, INITIAL ENCOUNTER: Status: ACTIVE | Noted: 2023-12-07

## 2023-12-08 NOTE — PROGRESS NOTES
58-year-old is seen with right shoulder pain.  He has been having persistent severe sharp shooting pain in the right shoulder over the last 5 months.  He works in industrial sales.  He does repetitive activities with the shoulder over the years.  He has had physical therapy without improvement.  He is right-hand dominant.  He has difficulty with overhead reaching and lifting activities.  Past medical social family history review of systems reviewed and updated on the information sheet.  Pleasant and no acute distress.  Right shoulder forward flexion 160°. No effusion or instability. There is positive Neer and Link impingement. There is subacromial crepitus and tenderness around the subacromial space.  Mild biceps tenderness. No acromioclavicular tenderness. Rotator cuff strength is decreased and there is discomfort with strength testing. Left shoulder forward flexion 180°. No effusion or instability. No impingement or crepitus or tenderness involving the subacromial space. No biceps or acromioclavicular tenderness. There is intact rotator cuff strength. There is adequate range of motion of the cervical spine without pain. Both upper extremities are well perfused, skin is intact and muscle tone is adequate. Elbow flexion and extension and wrist flexion and extension strength are intact.    Multiple x-ray views of the right shoulder are personally reviewed and there is no acute bony abnormality.    MRI of the right shoulder was personally reviewed and there is a full-thickness tear involving the supraspinatus tendon.  There are chondral changes involving the glenohumeral joint and irregularity involving the labrum.  There is subacromial bursitis and a glenohumeral effusion.    A detailed discussion about the rotator cuff tear was done.  Treatment options including no treatment were reviewed and the decision was made to proceed with a right shoulder arthroscopy with rotator cuff repair and extensive debridement and  subacromial decompression.  The surgery and postoperative course were reviewed in detail.  Risks including but not limited to infection thromboembolus neurovascular injury fracture medical problems stiffness and repair not healing were discussed and he understands this and has elected to proceed.  He will avoid aggravating activities in the meantime.

## 2023-12-21 ENCOUNTER — DOCUMENTATION (OUTPATIENT)
Dept: PHYSICAL THERAPY | Facility: CLINIC | Age: 58
End: 2023-12-21
Payer: COMMERCIAL

## 2023-12-21 NOTE — PROGRESS NOTES
Physical Therapy    Discharge Summary    Name: Rajiv Melvin  MRN: [id number]  : 1965  Date: 23    Discharge Summary: PT    Discharge Information: Date of discharge 23, Date of last visit 10/30/23, Date of evaluation 10/9/23, Number of attended visits 4, Referred by Dr. Funes, and Referred for right shoulder pain    Therapy Summary: pt did well with exercises but pain continued. MRI revealed RTC tear. Surgery is scheduled for 2024.       Rehab Discharge Reason: Other see above .   severe

## 2024-01-05 ENCOUNTER — OFFICE VISIT (OUTPATIENT)
Dept: PRIMARY CARE | Facility: CLINIC | Age: 59
End: 2024-01-05
Payer: COMMERCIAL

## 2024-01-05 VITALS
DIASTOLIC BLOOD PRESSURE: 80 MMHG | HEART RATE: 72 BPM | BODY MASS INDEX: 28.83 KG/M2 | TEMPERATURE: 97.4 F | HEIGHT: 70 IN | OXYGEN SATURATION: 97 % | SYSTOLIC BLOOD PRESSURE: 132 MMHG | WEIGHT: 201.4 LBS

## 2024-01-05 DIAGNOSIS — S46.011S TRAUMATIC COMPLETE TEAR OF RIGHT ROTATOR CUFF, SEQUELA: Primary | ICD-10-CM

## 2024-01-05 DIAGNOSIS — E78.5 HYPERLIPIDEMIA, UNSPECIFIED HYPERLIPIDEMIA TYPE: ICD-10-CM

## 2024-01-05 PROCEDURE — 3008F BODY MASS INDEX DOCD: CPT | Performed by: FAMILY MEDICINE

## 2024-01-05 PROCEDURE — 99214 OFFICE O/P EST MOD 30 MIN: CPT | Performed by: FAMILY MEDICINE

## 2024-01-05 PROCEDURE — 1036F TOBACCO NON-USER: CPT | Performed by: FAMILY MEDICINE

## 2024-01-05 SDOH — ECONOMIC STABILITY: TRANSPORTATION INSECURITY
IN THE PAST 12 MONTHS, HAS LACK OF TRANSPORTATION KEPT YOU FROM MEETINGS, WORK, OR FROM GETTING THINGS NEEDED FOR DAILY LIVING?: NO

## 2024-01-05 SDOH — ECONOMIC STABILITY: FOOD INSECURITY: WITHIN THE PAST 12 MONTHS, THE FOOD YOU BOUGHT JUST DIDN'T LAST AND YOU DIDN'T HAVE MONEY TO GET MORE.: NEVER TRUE

## 2024-01-05 SDOH — ECONOMIC STABILITY: TRANSPORTATION INSECURITY
IN THE PAST 12 MONTHS, HAS THE LACK OF TRANSPORTATION KEPT YOU FROM MEDICAL APPOINTMENTS OR FROM GETTING MEDICATIONS?: NO

## 2024-01-05 SDOH — ECONOMIC STABILITY: HOUSING INSECURITY
IN THE LAST 12 MONTHS, WAS THERE A TIME WHEN YOU DID NOT HAVE A STEADY PLACE TO SLEEP OR SLEPT IN A SHELTER (INCLUDING NOW)?: NO

## 2024-01-05 SDOH — ECONOMIC STABILITY: FOOD INSECURITY: WITHIN THE PAST 12 MONTHS, YOU WORRIED THAT YOUR FOOD WOULD RUN OUT BEFORE YOU GOT MONEY TO BUY MORE.: NEVER TRUE

## 2024-01-05 SDOH — ECONOMIC STABILITY: INCOME INSECURITY: IN THE LAST 12 MONTHS, WAS THERE A TIME WHEN YOU WERE NOT ABLE TO PAY THE MORTGAGE OR RENT ON TIME?: NO

## 2024-01-05 ASSESSMENT — ENCOUNTER SYMPTOMS
CARDIOVASCULAR NEGATIVE: 1
CONSTITUTIONAL NEGATIVE: 1
DEPRESSION: 0
NEUROLOGICAL NEGATIVE: 1
LOSS OF SENSATION IN FEET: 0
RESPIRATORY NEGATIVE: 1
OCCASIONAL FEELINGS OF UNSTEADINESS: 0

## 2024-01-05 ASSESSMENT — PAIN SCALES - GENERAL: PAINLEVEL: 1

## 2024-01-05 ASSESSMENT — SOCIAL DETERMINANTS OF HEALTH (SDOH)
WITHIN THE LAST YEAR, HAVE YOU BEEN AFRAID OF YOUR PARTNER OR EX-PARTNER?: NO
WITHIN THE LAST YEAR, HAVE YOU BEEN HUMILIATED OR EMOTIONALLY ABUSED IN OTHER WAYS BY YOUR PARTNER OR EX-PARTNER?: NO
HOW HARD IS IT FOR YOU TO PAY FOR THE VERY BASICS LIKE FOOD, HOUSING, MEDICAL CARE, AND HEATING?: NOT HARD AT ALL
WITHIN THE LAST YEAR, HAVE TO BEEN RAPED OR FORCED TO HAVE ANY KIND OF SEXUAL ACTIVITY BY YOUR PARTNER OR EX-PARTNER?: NO
WITHIN THE LAST YEAR, HAVE YOU BEEN KICKED, HIT, SLAPPED, OR OTHERWISE PHYSICALLY HURT BY YOUR PARTNER OR EX-PARTNER?: NO
IN THE PAST 12 MONTHS, HAS THE ELECTRIC, GAS, OIL, OR WATER COMPANY THREATENED TO SHUT OFF SERVICE IN YOUR HOME?: NO

## 2024-01-05 NOTE — H&P (VIEW-ONLY)
"Subjective   Patient ID: Rajiv Melvin is a 58 y.o. male who presents for surgery clearance (Surgery clearance 1/19/2024- right shoulder 1).    HPI     Review of Systems   Constitutional: Negative.    Respiratory: Negative.     Cardiovascular: Negative.    Musculoskeletal:         Right rotator cuff tear due for repair January 19, 2024 Dr. Dunlap   Neurological: Negative.        Objective   /80 (BP Location: Left arm)   Pulse 72   Temp 36.3 °C (97.4 °F) (Temporal)   Ht 1.778 m (5' 10\")   Wt 91.4 kg (201 lb 6.4 oz)   SpO2 97%   BMI 28.90 kg/m²     Physical Exam  Vitals and nursing note reviewed.   Constitutional:       Appearance: Normal appearance.   HENT:      Right Ear: Tympanic membrane normal.      Left Ear: Tympanic membrane normal.   Cardiovascular:      Rate and Rhythm: Normal rate and regular rhythm.      Pulses: Normal pulses.      Heart sounds: Normal heart sounds.   Pulmonary:      Breath sounds: Normal breath sounds.   Musculoskeletal:      Comments: Tear right rotator cuff.  Anticipated surgery   Neurological:      Mental Status: He is alert and oriented to person, place, and time.   Psychiatric:         Mood and Affect: Mood normal.         Behavior: Behavior normal.         Assessment/Plan patient seen here for surgical clearance for a right shoulder repair for rotator cuff tear by Dr. Dunlap.  He is medically cleared for the anticipated orthopedic procedure.  Will let Dr. Dunlap know         "

## 2024-01-16 ENCOUNTER — LAB (OUTPATIENT)
Dept: LAB | Facility: LAB | Age: 59
End: 2024-01-16
Payer: COMMERCIAL

## 2024-01-16 DIAGNOSIS — Z01.818 PRE-OP TESTING: ICD-10-CM

## 2024-01-16 LAB
ANION GAP SERPL CALC-SCNC: 13 MMOL/L (ref 10–20)
BUN SERPL-MCNC: 18 MG/DL (ref 6–23)
CALCIUM SERPL-MCNC: 9.6 MG/DL (ref 8.6–10.6)
CHLORIDE SERPL-SCNC: 104 MMOL/L (ref 98–107)
CO2 SERPL-SCNC: 28 MMOL/L (ref 21–32)
CREAT SERPL-MCNC: 1.02 MG/DL (ref 0.5–1.3)
EGFRCR SERPLBLD CKD-EPI 2021: 85 ML/MIN/1.73M*2
GLUCOSE SERPL-MCNC: 175 MG/DL (ref 74–99)
HCT VFR BLD AUTO: 42.6 % (ref 41–52)
HGB BLD-MCNC: 13.9 G/DL (ref 13.5–17.5)
POTASSIUM SERPL-SCNC: 3.7 MMOL/L (ref 3.5–5.3)
SODIUM SERPL-SCNC: 141 MMOL/L (ref 136–145)

## 2024-01-16 PROCEDURE — 36415 COLL VENOUS BLD VENIPUNCTURE: CPT

## 2024-01-16 PROCEDURE — 85018 HEMOGLOBIN: CPT

## 2024-01-16 PROCEDURE — 80048 BASIC METABOLIC PNL TOTAL CA: CPT

## 2024-01-16 PROCEDURE — 85014 HEMATOCRIT: CPT

## 2024-01-19 ENCOUNTER — ANESTHESIA EVENT (OUTPATIENT)
Dept: OPERATING ROOM | Facility: HOSPITAL | Age: 59
End: 2024-01-19
Payer: COMMERCIAL

## 2024-01-19 ENCOUNTER — HOSPITAL ENCOUNTER (OUTPATIENT)
Facility: HOSPITAL | Age: 59
Setting detail: OUTPATIENT SURGERY
Discharge: HOME | End: 2024-01-19
Attending: ORTHOPAEDIC SURGERY | Admitting: ORTHOPAEDIC SURGERY
Payer: COMMERCIAL

## 2024-01-19 ENCOUNTER — ANESTHESIA (OUTPATIENT)
Dept: OPERATING ROOM | Facility: HOSPITAL | Age: 59
End: 2024-01-19
Payer: COMMERCIAL

## 2024-01-19 VITALS
BODY MASS INDEX: 28.82 KG/M2 | WEIGHT: 201.28 LBS | TEMPERATURE: 97.7 F | RESPIRATION RATE: 16 BRPM | DIASTOLIC BLOOD PRESSURE: 80 MMHG | OXYGEN SATURATION: 99 % | HEIGHT: 70 IN | SYSTOLIC BLOOD PRESSURE: 149 MMHG | HEART RATE: 73 BPM

## 2024-01-19 DIAGNOSIS — M25.511 CHRONIC RIGHT SHOULDER PAIN: Primary | ICD-10-CM

## 2024-01-19 DIAGNOSIS — S46.011A ROTATOR CUFF STRAIN, RIGHT, INITIAL ENCOUNTER: ICD-10-CM

## 2024-01-19 DIAGNOSIS — Z01.818 PRE-OP TESTING: ICD-10-CM

## 2024-01-19 DIAGNOSIS — G89.29 CHRONIC RIGHT SHOULDER PAIN: Primary | ICD-10-CM

## 2024-01-19 PROCEDURE — 3700000001 HC GENERAL ANESTHESIA TIME - INITIAL BASE CHARGE: Performed by: ORTHOPAEDIC SURGERY

## 2024-01-19 PROCEDURE — 3600000004 HC OR TIME - INITIAL BASE CHARGE - PROCEDURE LEVEL FOUR: Performed by: ORTHOPAEDIC SURGERY

## 2024-01-19 PROCEDURE — 2780000003 HC OR 278 NO HCPCS: Performed by: ORTHOPAEDIC SURGERY

## 2024-01-19 PROCEDURE — 2500000004 HC RX 250 GENERAL PHARMACY W/ HCPCS (ALT 636 FOR OP/ED): Performed by: ANESTHESIOLOGY

## 2024-01-19 PROCEDURE — 7100000002 HC RECOVERY ROOM TIME - EACH INCREMENTAL 1 MINUTE: Performed by: ORTHOPAEDIC SURGERY

## 2024-01-19 PROCEDURE — 2500000004 HC RX 250 GENERAL PHARMACY W/ HCPCS (ALT 636 FOR OP/ED): Performed by: ORTHOPAEDIC SURGERY

## 2024-01-19 PROCEDURE — 7100000001 HC RECOVERY ROOM TIME - INITIAL BASE CHARGE: Performed by: ORTHOPAEDIC SURGERY

## 2024-01-19 PROCEDURE — 3700000002 HC GENERAL ANESTHESIA TIME - EACH INCREMENTAL 1 MINUTE: Performed by: ORTHOPAEDIC SURGERY

## 2024-01-19 PROCEDURE — 3600000009 HC OR TIME - EACH INCREMENTAL 1 MINUTE - PROCEDURE LEVEL FOUR: Performed by: ORTHOPAEDIC SURGERY

## 2024-01-19 PROCEDURE — 29823 SHO ARTHRS SRG XTNSV DBRDMT: CPT | Performed by: ORTHOPAEDIC SURGERY

## 2024-01-19 PROCEDURE — 7100000009 HC PHASE TWO TIME - INITIAL BASE CHARGE: Performed by: ORTHOPAEDIC SURGERY

## 2024-01-19 PROCEDURE — C1713 ANCHOR/SCREW BN/BN,TIS/BN: HCPCS | Performed by: ORTHOPAEDIC SURGERY

## 2024-01-19 PROCEDURE — 29826 SHO ARTHRS SRG DECOMPRESSION: CPT | Performed by: ORTHOPAEDIC SURGERY

## 2024-01-19 PROCEDURE — A4217 STERILE WATER/SALINE, 500 ML: HCPCS | Performed by: ORTHOPAEDIC SURGERY

## 2024-01-19 PROCEDURE — 2720000007 HC OR 272 NO HCPCS: Performed by: ORTHOPAEDIC SURGERY

## 2024-01-19 PROCEDURE — 2500000005 HC RX 250 GENERAL PHARMACY W/O HCPCS: Performed by: ANESTHESIOLOGY

## 2024-01-19 PROCEDURE — 7100000010 HC PHASE TWO TIME - EACH INCREMENTAL 1 MINUTE: Performed by: ORTHOPAEDIC SURGERY

## 2024-01-19 PROCEDURE — 29827 SHO ARTHRS SRG RT8TR CUF RPR: CPT | Performed by: ORTHOPAEDIC SURGERY

## 2024-01-19 PROCEDURE — 76942 ECHO GUIDE FOR BIOPSY: CPT | Performed by: ANESTHESIOLOGY

## 2024-01-19 PROCEDURE — 93010 ELECTROCARDIOGRAM REPORT: CPT | Performed by: INTERNAL MEDICINE

## 2024-01-19 DEVICE — ANCHOR, BIOCOMPOSITE SWIVELOCK 4.75 X 19.1: Type: IMPLANTABLE DEVICE | Site: SHOULDER | Status: FUNCTIONAL

## 2024-01-19 DEVICE — ANCHOR, BIOCOMPOSITE CORKSCREW FT, 5.5 X 14.7MM, W/TWO 1.3 SUTURE TAPE: Type: IMPLANTABLE DEVICE | Site: SHOULDER | Status: FUNCTIONAL

## 2024-01-19 RX ORDER — FENTANYL CITRATE 50 UG/ML
INJECTION, SOLUTION INTRAMUSCULAR; INTRAVENOUS
Status: COMPLETED
Start: 2024-01-19 | End: 2024-01-19

## 2024-01-19 RX ORDER — HYDRALAZINE HYDROCHLORIDE 20 MG/ML
5 INJECTION INTRAMUSCULAR; INTRAVENOUS EVERY 30 MIN PRN
Status: DISCONTINUED | OUTPATIENT
Start: 2024-01-19 | End: 2024-01-19 | Stop reason: HOSPADM

## 2024-01-19 RX ORDER — ALBUTEROL SULFATE 0.83 MG/ML
2.5 SOLUTION RESPIRATORY (INHALATION) ONCE AS NEEDED
Status: DISCONTINUED | OUTPATIENT
Start: 2024-01-19 | End: 2024-01-19 | Stop reason: HOSPADM

## 2024-01-19 RX ORDER — LABETALOL HYDROCHLORIDE 5 MG/ML
5 INJECTION, SOLUTION INTRAVENOUS ONCE AS NEEDED
Status: DISCONTINUED | OUTPATIENT
Start: 2024-01-19 | End: 2024-01-19 | Stop reason: HOSPADM

## 2024-01-19 RX ORDER — DIPHENHYDRAMINE HYDROCHLORIDE 50 MG/ML
25 INJECTION INTRAMUSCULAR; INTRAVENOUS ONCE AS NEEDED
Status: DISCONTINUED | OUTPATIENT
Start: 2024-01-19 | End: 2024-01-19 | Stop reason: HOSPADM

## 2024-01-19 RX ORDER — MIDAZOLAM HYDROCHLORIDE 1 MG/ML
INJECTION, SOLUTION INTRAMUSCULAR; INTRAVENOUS AS NEEDED
Status: DISCONTINUED | OUTPATIENT
Start: 2024-01-19 | End: 2024-01-19

## 2024-01-19 RX ORDER — ONDANSETRON 4 MG/1
4 TABLET, FILM COATED ORAL EVERY 8 HOURS PRN
Qty: 10 TABLET | Refills: 0 | Status: SHIPPED | OUTPATIENT
Start: 2024-01-19 | End: 2024-01-26

## 2024-01-19 RX ORDER — MORPHINE SULFATE 2 MG/ML
2 INJECTION, SOLUTION INTRAMUSCULAR; INTRAVENOUS EVERY 5 MIN PRN
Status: DISCONTINUED | OUTPATIENT
Start: 2024-01-19 | End: 2024-01-19 | Stop reason: HOSPADM

## 2024-01-19 RX ORDER — ROCURONIUM BROMIDE 10 MG/ML
INJECTION, SOLUTION INTRAVENOUS AS NEEDED
Status: DISCONTINUED | OUTPATIENT
Start: 2024-01-19 | End: 2024-01-19

## 2024-01-19 RX ORDER — PROPOFOL 10 MG/ML
INJECTION, EMULSION INTRAVENOUS AS NEEDED
Status: DISCONTINUED | OUTPATIENT
Start: 2024-01-19 | End: 2024-01-19

## 2024-01-19 RX ORDER — DEXAMETHASONE SODIUM PHOSPHATE 4 MG/ML
INJECTION, SOLUTION INTRA-ARTICULAR; INTRALESIONAL; INTRAMUSCULAR; INTRAVENOUS; SOFT TISSUE AS NEEDED
Status: DISCONTINUED | OUTPATIENT
Start: 2024-01-19 | End: 2024-01-19

## 2024-01-19 RX ORDER — FENTANYL CITRATE 50 UG/ML
INJECTION, SOLUTION INTRAMUSCULAR; INTRAVENOUS AS NEEDED
Status: DISCONTINUED | OUTPATIENT
Start: 2024-01-19 | End: 2024-01-19

## 2024-01-19 RX ORDER — METOPROLOL TARTRATE 1 MG/ML
5 INJECTION, SOLUTION INTRAVENOUS ONCE
Status: DISCONTINUED | OUTPATIENT
Start: 2024-01-19 | End: 2024-01-19 | Stop reason: HOSPADM

## 2024-01-19 RX ORDER — SODIUM CHLORIDE, SODIUM LACTATE, POTASSIUM CHLORIDE, CALCIUM CHLORIDE 600; 310; 30; 20 MG/100ML; MG/100ML; MG/100ML; MG/100ML
100 INJECTION, SOLUTION INTRAVENOUS CONTINUOUS
Status: DISCONTINUED | OUTPATIENT
Start: 2024-01-19 | End: 2024-01-19 | Stop reason: HOSPADM

## 2024-01-19 RX ORDER — SCOLOPAMINE TRANSDERMAL SYSTEM 1 MG/1
1 PATCH, EXTENDED RELEASE TRANSDERMAL ONCE
Status: DISCONTINUED | OUTPATIENT
Start: 2024-01-19 | End: 2024-01-19 | Stop reason: HOSPADM

## 2024-01-19 RX ORDER — LIDOCAINE HCL/PF 100 MG/5ML
SYRINGE (ML) INTRAVENOUS AS NEEDED
Status: DISCONTINUED | OUTPATIENT
Start: 2024-01-19 | End: 2024-01-19

## 2024-01-19 RX ORDER — SODIUM CHLORIDE 0.9 G/100ML
IRRIGANT IRRIGATION AS NEEDED
Status: DISCONTINUED | OUTPATIENT
Start: 2024-01-19 | End: 2024-01-19 | Stop reason: HOSPADM

## 2024-01-19 RX ORDER — OXYCODONE HYDROCHLORIDE 5 MG/1
5 TABLET ORAL EVERY 4 HOURS PRN
Qty: 30 TABLET | Refills: 0 | Status: SHIPPED | OUTPATIENT
Start: 2024-01-19 | End: 2024-01-26

## 2024-01-19 RX ORDER — CEFAZOLIN SODIUM 2 G/100ML
INJECTION, SOLUTION INTRAVENOUS
Status: COMPLETED
Start: 2024-01-19 | End: 2024-01-19

## 2024-01-19 RX ORDER — HYDROMORPHONE HYDROCHLORIDE 1 MG/ML
1 INJECTION, SOLUTION INTRAMUSCULAR; INTRAVENOUS; SUBCUTANEOUS EVERY 5 MIN PRN
Status: DISCONTINUED | OUTPATIENT
Start: 2024-01-19 | End: 2024-01-19 | Stop reason: HOSPADM

## 2024-01-19 RX ORDER — MEPERIDINE HYDROCHLORIDE 25 MG/ML
12.5 INJECTION INTRAMUSCULAR; INTRAVENOUS; SUBCUTANEOUS EVERY 10 MIN PRN
Status: DISCONTINUED | OUTPATIENT
Start: 2024-01-19 | End: 2024-01-19 | Stop reason: HOSPADM

## 2024-01-19 RX ORDER — MIDAZOLAM HYDROCHLORIDE 1 MG/ML
INJECTION, SOLUTION INTRAMUSCULAR; INTRAVENOUS
Status: COMPLETED
Start: 2024-01-19 | End: 2024-01-19

## 2024-01-19 RX ORDER — CEFAZOLIN SODIUM 2 G/100ML
2 INJECTION, SOLUTION INTRAVENOUS EVERY 8 HOURS
Status: COMPLETED | OUTPATIENT
Start: 2024-01-19 | End: 2024-01-19

## 2024-01-19 RX ORDER — MIDAZOLAM HYDROCHLORIDE 1 MG/ML
1 INJECTION, SOLUTION INTRAMUSCULAR; INTRAVENOUS ONCE AS NEEDED
Status: DISCONTINUED | OUTPATIENT
Start: 2024-01-19 | End: 2024-01-19 | Stop reason: HOSPADM

## 2024-01-19 RX ORDER — ONDANSETRON HYDROCHLORIDE 2 MG/ML
INJECTION, SOLUTION INTRAVENOUS AS NEEDED
Status: DISCONTINUED | OUTPATIENT
Start: 2024-01-19 | End: 2024-01-19

## 2024-01-19 RX ORDER — FAMOTIDINE 10 MG/ML
20 INJECTION INTRAVENOUS ONCE
Status: DISCONTINUED | OUTPATIENT
Start: 2024-01-19 | End: 2024-01-19 | Stop reason: HOSPADM

## 2024-01-19 RX ORDER — ONDANSETRON HYDROCHLORIDE 2 MG/ML
4 INJECTION, SOLUTION INTRAVENOUS ONCE AS NEEDED
Status: DISCONTINUED | OUTPATIENT
Start: 2024-01-19 | End: 2024-01-19 | Stop reason: HOSPADM

## 2024-01-19 RX ORDER — ACETAMINOPHEN 325 MG/1
975 TABLET ORAL ONCE
Status: DISCONTINUED | OUTPATIENT
Start: 2024-01-19 | End: 2024-01-19 | Stop reason: HOSPADM

## 2024-01-19 RX ADMIN — FENTANYL CITRATE 100 MCG: 50 INJECTION, SOLUTION INTRAMUSCULAR; INTRAVENOUS at 08:07

## 2024-01-19 RX ADMIN — MIDAZOLAM 2 MG: 1 INJECTION INTRAMUSCULAR; INTRAVENOUS at 08:07

## 2024-01-19 RX ADMIN — ROCURONIUM BROMIDE 50 MG: 10 INJECTION, SOLUTION INTRAVENOUS at 09:02

## 2024-01-19 RX ADMIN — LIDOCAINE HYDROCHLORIDE 60 MG: 20 INJECTION INTRAVENOUS at 09:02

## 2024-01-19 RX ADMIN — FENTANYL CITRATE 50 MCG: 50 INJECTION, SOLUTION INTRAMUSCULAR; INTRAVENOUS at 09:28

## 2024-01-19 RX ADMIN — PROPOFOL 200 MG: 10 INJECTION, EMULSION INTRAVENOUS at 09:02

## 2024-01-19 RX ADMIN — SODIUM CHLORIDE, SODIUM LACTATE, POTASSIUM CHLORIDE, AND CALCIUM CHLORIDE 100 ML/HR: 600; 310; 30; 20 INJECTION, SOLUTION INTRAVENOUS at 07:16

## 2024-01-19 RX ADMIN — ONDANSETRON 4 MG: 2 INJECTION INTRAMUSCULAR; INTRAVENOUS at 10:30

## 2024-01-19 RX ADMIN — FENTANYL CITRATE 50 MCG: 50 INJECTION, SOLUTION INTRAMUSCULAR; INTRAVENOUS at 09:20

## 2024-01-19 RX ADMIN — SODIUM CHLORIDE, SODIUM LACTATE, POTASSIUM CHLORIDE, AND CALCIUM CHLORIDE: 600; 310; 30; 20 INJECTION, SOLUTION INTRAVENOUS at 10:02

## 2024-01-19 RX ADMIN — DEXAMETHASONE SODIUM PHOSPHATE 4 MG: 4 INJECTION, SOLUTION INTRAMUSCULAR; INTRAVENOUS at 09:16

## 2024-01-19 RX ADMIN — CEFAZOLIN SODIUM 2 G: 2 INJECTION, SOLUTION INTRAVENOUS at 09:13

## 2024-01-19 SDOH — HEALTH STABILITY: MENTAL HEALTH: CURRENT SMOKER: 0

## 2024-01-19 ASSESSMENT — PAIN - FUNCTIONAL ASSESSMENT
PAIN_FUNCTIONAL_ASSESSMENT: 0-10

## 2024-01-19 ASSESSMENT — PAIN SCALES - GENERAL
PAINLEVEL_OUTOF10: 0 - NO PAIN
PAIN_LEVEL: 0
PAINLEVEL_OUTOF10: 0 - NO PAIN

## 2024-01-19 ASSESSMENT — COLUMBIA-SUICIDE SEVERITY RATING SCALE - C-SSRS
2. HAVE YOU ACTUALLY HAD ANY THOUGHTS OF KILLING YOURSELF?: NO
1. IN THE PAST MONTH, HAVE YOU WISHED YOU WERE DEAD OR WISHED YOU COULD GO TO SLEEP AND NOT WAKE UP?: NO

## 2024-01-19 NOTE — ANESTHESIA PREPROCEDURE EVALUATION
Patient: Rajiv Melvin    Procedure Information       Date/Time: 01/19/24 0900    Procedures:       RIGHT SHOULDER ARTHROSCOPIC ROTATOR CUFF REPAIR (Right: Shoulder) - Scalene Block      & POSSIBLE DEBRIDEMENT (Right: Shoulder)    Location: [facility] / [facility]    Surgeons: Steve Dunlap MD            Relevant Problems   Anesthesia (within normal limits)      Cardiovascular   (+) CAD (coronary artery disease)   (+) Hyperlipidemia       Clinical information reviewed:    Allergies  Meds               NPO Detail:  NPO/Void Status  Carbohydrate Drink Given Prior to Surgery? : N  Date of Last Liquid: 01/18/24  Time of Last Liquid: 2030  Date of Last Solid: 01/18/24  Time of Last Solid: 1800  Last Intake Type: Solid meal  Time of Last Void: 0712         Physical Exam    Airway  Mallampati: II  TM distance: >3 FB  Neck ROM: full     Cardiovascular - normal exam  Rhythm: regular  Rate: normal     Dental - normal exam     Pulmonary - normal exam     Abdominal            Anesthesia Plan    History of general anesthesia?: yes  History of complications of general anesthesia?: no    ASA 3     general and regional   (R/B/A to anesthesia discussed with patient at length.  All questions answered.  Patient wishes to proceed with peripheral nerve block with general anesthetic.  Patient premedicated with 2mg Midazolam and 100ucg fentanyl.  ASA monitors placed with 4L nasal canula oxygen.  Time out done with RN and Anesthesia Tech.  Strict aseptic technique/sterile prep and drape.  30cc 0.5% Ropivicaine injected in divided doses with negative aspiration confirmed every 5cc.  Patient tolerated procedure.  No complications, No complaints.  All VSS.  RN and anesthesia tech in room with Patient and MD at all times )  The patient is not a current smoker.    intravenous induction   Trial extubation is planned.  Anesthetic plan and risks discussed with patient.    Plan discussed with CRNA, CAA and attending.

## 2024-01-19 NOTE — OP NOTE
RIGHT SHOULDER ARTHROSCOPIC ROTATOR CUFF REPAIR / & Subacromial Decompression (R), DEBRIDEMENT (R) Operative Note     Date: 2024  OR Location: [facility]    Name: Rajiv Melvin, : 1965, Age: 58 y.o., MRN: [id number], Sex: male    Diagnosis  Pre-op Diagnosis     * Rotator cuff strain, right, initial encounter [S46.011A] Post-op Diagnosis     * Rotator cuff strain, right, initial encounter [S46.011A]     Procedures  RIGHT SHOULDER ARTHROSCOPIC ROTATOR CUFF REPAIR / & Subacromial Decompression  27853 - [place] SURGICAL ARTHROSCOPY SHOULDER W/ROTATOR CUFF RPR    DEBRIDEMENT  32753 - [place] SURGICAL ARTHROSCOPY SHOULDER XTNSV DBRDMT 3+    [place] SURGICAL ARTHROSCOPY GABE W/CORACOACRM LIGM RLS [90970]  Surgeons      * Steve Dunlap - Primary    Resident/Fellow/Other Assistant:  Surgeon(s) and Role: Vincent Jorge    Procedure Summary  Anesthesia: General  ASA: III  Anesthesia Staff: Anesthesiologist: Lizbeth Chen MD  Estimated Blood Loss: 2mL    Indications: 58-year-old with right shoulder pain and MRI demonstrating rotator cuff tear.  Treatment options including no treatment reviewed and the decision was made to proceed with surgery.    Description procedure patient was brought in the operating room.  General anesthesia was performed.  Scalene block had been performed in the preoperative area.  He was positioned in the lateral cubitus position prepped and draped in usual fashion.  The joint injected with saline and a posterior thrust portal established.  Arthroscopic examination of the joints performed.  There was a full-thickness tear involving the entirety of the supraspinatus tendon.  There was tearing involving the superior labrum anteriorly and posteriorly.  There was grade II chondromalacia on the superior glenoid.  Grade II chondromalacia on the posterior humeral head.  Subscapularis tendon was intact.  The anterior-inferior and posterior inferior labrum were intact.  No loose bodies inferior pouch.  Anterior  portal was established.  The motorized shaver was introduced.  With the motorized shaver debridement of the labrum was done to provide stable cartilage edge.  Motorized shaver was used to perform chondroplasty of the glenoid.  Motorized shaver was also used to perform chondroplasty involving the humeral head.  The rotator cuff was debrided to healthier appearing tissue.  The arthroscope was placed anteriorly and the motorized shaver was introduced to the posterior portal and additional labral debridement as well as chondroplasty along the posterior aspect the humeral head was done.  The bicep tendon had a normal appearance.  The arthroscope was placed in the subacromial space.  There was extensive subacromial bursitis.  A lateral portal was established.  Using combination of the shaver and the radiofrequency wand subacromial bursectomy was performed.  The CA ligament was released.  Using the motorized bur acromioplasty was performed until there was adequate decompression of the subacromial space.  There was a infraclavicular spur along the distal clavicle and using the motorized bur this was removed and the distal clavicle was coplaning.  The rotator cuff was visualized from the bursal side and this was a full-thickness tear.  That with the motorized shaver the rotator cuff was debrided to healthier appearing tissue.  Soft tissue was debrided from the greater tuberosity to create a bleeding surface.  Through an accessory portal an Arthrex bio composite corkscrew anchor was placed.  This had good purchase in the bone.  The sutures were passed in a mattress type fashion through the rotator cuff and then tied down with arthroscopic knot tying.  The sutures were then secured to the lateral cortex of the humerus with an Arthrex swivel lock anchor to provide for modified double row fixation.  Very secure fixation of the rotator cuff was obtained in this manner.  The shoulder was well irrigated.  Hemostasis obtained as  needed.  The instruments removed portals closed with nylon suture sterile dressing and a sling were applied and se tolerated procedure well and was taken to the recovery room in a stable condition.      Intra-op Medications:   Medication Name Total Dose   sodium chloride 0.9 % irrigation solution 3,000 mL   lactated Ringer's infusion 30 mL   ceFAZolin in dextrose (iso-os) (Ancef) IVPB  - Omnicell Override Pull Cannot be calculated   ceFAZolin in dextrose (iso-os) (Ancef) IVPB 2 g 2 g              Anesthesia Record               Intraprocedure I/O Totals          Intake    lactated Ringer's infusion 1030.00 mL    ceFAZolin in dextrose (iso-os) (Ancef) IVPB 2 g 100.00 mL    Total Intake 1130 mL          Specimen: No specimens collected     Staff:   Circulator: Nadia Duarte RN  Scrub Person: Emil Smyth             Implants:  Implants       Type Name Action Serial No.      Screw ANCHOR, BIOCOMPOSITE CORKSCREW FT, 5.5 X 14.7MM, W/TWO 1.3 SUTURE TAPE - [id number] Implanted      Screw ANCHOR, BIOCOMPOSITE SWIVELOCK 4.75 X 19.1 - [id number] Implanted             Attending Attestation: I performed the procedure.    Steve Dunlap  Phone Number: [phone]

## 2024-01-19 NOTE — ANESTHESIA POSTPROCEDURE EVALUATION
Patient: Rajiv Melvin    Procedure Summary       Date: 01/19/24 Room / Location: [facility] / [facility]    Anesthesia Start: 0858 Anesthesia Stop: 1039    Procedures:       RIGHT SHOULDER ARTHROSCOPIC ROTATOR CUFF REPAIR / & Subacromial Decompression (Right: Shoulder)      DEBRIDEMENT (Right: Shoulder) Diagnosis:       Rotator cuff strain, right, initial encounter      (Rotator cuff strain, right, initial encounter [S46.011A])    Surgeons: Steve Dunlap MD Responsible Provider: Lizbeth Chen MD    Anesthesia Type: general, regional ASA Status: 3            Anesthesia Type: general, regional    Vitals Value Taken Time   /66 01/19/24 1035   Temp 36.2 01/19/24 1039   Pulse 76 01/19/24 1037   Resp 18 01/19/24 1039   SpO2 98 % 01/19/24 1037   Vitals shown include unvalidated device data.    Anesthesia Post Evaluation    Patient location during evaluation: PACU  Patient participation: complete - patient participated  Level of consciousness: awake and alert  Pain score: 0  Pain management: adequate  Airway patency: patent  Cardiovascular status: acceptable and hemodynamically stable  Respiratory status: acceptable, spontaneous ventilation and nasal cannula  Hydration status: acceptable  Postoperative Nausea and Vomiting: none        No notable events documented.

## 2024-01-26 ENCOUNTER — OFFICE VISIT (OUTPATIENT)
Dept: ORTHOPEDIC SURGERY | Facility: CLINIC | Age: 59
End: 2024-01-26
Payer: COMMERCIAL

## 2024-01-26 VITALS — WEIGHT: 201 LBS | HEIGHT: 70 IN | BODY MASS INDEX: 28.77 KG/M2

## 2024-01-26 DIAGNOSIS — S46.011D STRAIN OF ROTATOR CUFF, RIGHT, SUBSEQUENT ENCOUNTER: ICD-10-CM

## 2024-01-26 PROCEDURE — 99024 POSTOP FOLLOW-UP VISIT: CPT | Performed by: ORTHOPAEDIC SURGERY

## 2024-01-26 PROCEDURE — 3008F BODY MASS INDEX DOCD: CPT | Performed by: ORTHOPAEDIC SURGERY

## 2024-01-26 PROCEDURE — 1036F TOBACCO NON-USER: CPT | Performed by: ORTHOPAEDIC SURGERY

## 2024-01-26 NOTE — PROGRESS NOTES
58-year-old is seen for postop follow-up following right shoulder arthroscopy with rotator cuff repair, subacromial decompression and extensive debridement on January 19, 2024.  Doing better and having less pain. He has been taking Tylenol 1000 mg QID, and Oxycodone PRN at night to help sleep.    The portals are healed without evidence of infection.    The sutures were removed and Steri-Strips applied. The arthroscopic pictures and postoperative precautions were reviewed. Physical therapy will be started. Follow up in 6 weeks.

## 2024-01-27 LAB
ATRIAL RATE: 66 BPM
P AXIS: 33 DEGREES
P OFFSET: 183 MS
P ONSET: 127 MS
PR INTERVAL: 182 MS
Q ONSET: 218 MS
QRS COUNT: 11 BEATS
QRS DURATION: 88 MS
QT INTERVAL: 368 MS
QTC CALCULATION(BAZETT): 385 MS
QTC FREDERICIA: 380 MS
R AXIS: 1 DEGREES
T AXIS: -16 DEGREES
T OFFSET: 402 MS
VENTRICULAR RATE: 66 BPM

## 2024-02-08 ENCOUNTER — EVALUATION (OUTPATIENT)
Dept: PHYSICAL THERAPY | Facility: CLINIC | Age: 59
End: 2024-02-08
Payer: COMMERCIAL

## 2024-02-08 DIAGNOSIS — S46.011D STRAIN OF ROTATOR CUFF, RIGHT, SUBSEQUENT ENCOUNTER: Primary | ICD-10-CM

## 2024-02-08 PROCEDURE — 97161 PT EVAL LOW COMPLEX 20 MIN: CPT | Mod: GP | Performed by: PHYSICAL THERAPIST

## 2024-02-08 PROCEDURE — 97110 THERAPEUTIC EXERCISES: CPT | Mod: GP | Performed by: PHYSICAL THERAPIST

## 2024-02-08 PROCEDURE — 97140 MANUAL THERAPY 1/> REGIONS: CPT | Mod: GP | Performed by: PHYSICAL THERAPIST

## 2024-02-08 ASSESSMENT — ENCOUNTER SYMPTOMS
OCCASIONAL FEELINGS OF UNSTEADINESS: 0
DEPRESSION: 0
LOSS OF SENSATION IN FEET: 0

## 2024-02-08 ASSESSMENT — PATIENT HEALTH QUESTIONNAIRE - PHQ9
1. LITTLE INTEREST OR PLEASURE IN DOING THINGS: NOT AT ALL
SUM OF ALL RESPONSES TO PHQ9 QUESTIONS 1 AND 2: 0
2. FEELING DOWN, DEPRESSED OR HOPELESS: NOT AT ALL

## 2024-02-08 NOTE — PROGRESS NOTES
Physical Therapy    Physical Therapy Evaluation and Treatment      Patient Name: Rajiv Melvin  MRN: [id number]  Today's Date: 2/8/2024  Time Calculation  Start Time: 1200  Stop Time: 1245  Time Calculation (min): 45 min        Current Problem:   1. Strain of rotator cuff, right, subsequent encounter  Referral to Physical Therapy    Follow Up In Physical Therapy            Referring provider: Dr. Dunlap    Insurance:  Visit #: 1  Approved number of visits: 60  Auth Required: no      Precautions: see protocol on DWP, no fall risk    Assessment: Pt is 58 y.o. right hand dominant, male s/p right rotator cuff repair and SAD performed on 1/19/24. Pt demonstrates round shoulder posture, decreased right shoulder PROM, and tenderness to right upper trap and levator scap. Strength and AROM not tested due to post op status.  Signs and symptoms consistent with MD diagnosis. Pt is a good candidate for skilled PT intervention to increase right shoulder ROM, strength, and return to prior functional level.     Plan:  Interventions: Biofeedback, Cryotherapy, Dry Needling, Education/Instruction, Electrical Stimulation, Home Program, Hot Pack, Kinesiotaping, Manual Therapy, Neuromuscular Re-education, Self Care/Home Management, self-care, Therapeutic Exercises, Ultrasound, Mechanical Traction, Aquatic Therapy, Vasopneumatic device with cold  Frequency and Duration: 2x week for 12 weeks  Rehab Potential: good  Plan of Care Agreement: patient      Goals:  Pt will meet the following goals in 12 weeks  Pt will report <0-2/10 pain with ADL's and functional mobility.  Pt will be independent with HEP at least 3 days per week to maintain gains made in therapy.   Pt will increase right shoulder AROM > 150 flexion and abduction, HBB reach to at least L4, and ER reach to at least occiput, in order to assist pt in dressing, reaching overhead to put away groceries, washing hair, etc.  Pt will increase right shoulder strength >/=4+/5 to allow patient  to push, pull, lift needed for cleaning, yard work, cooking, exercise, etc.  QUICK DASH < 5.0 demonstrating improved QOL   Pt will sleep full 6 hours in bed without incline wedge, allowing for restorative sleep        Subjective:  Chief complaint: Right shoulder pain, weakness, loss of function, right hand dominant    Relevant PMH: R rotator cuff repair, wearing sling outside of house, inside of house doesn't wear sling, not sleeping with sling. Return to doctor 3/12/24.     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     Exercise: currently just walking on treadmill, would like to get back to regular weight lifting    Pt goals for PT: return to regular activities and exercise    Objective:    Posture:  Forward head, round shoulders  Pt sitting with right arm resting against side and IR support across stomach    Strength (MMT):  RIGHT shoulder not tested due to post op status  Left shoulder grossly 5/5    Range of Motion (degrees of motion):  PASSIVE  Shoulder flexion: R 80, L WNL  Shoulder abduction: R 90, L WNL  Shoulder ER: R 10, L WNL  Shoulder IR: R 38, L WNL    Palpation/other:  Tender and tight to right upper trap, levator scap    Outcome Measure:  Quick DASH= 50    Treatment today:   1. Initial evaluation   2. Pt ed on diagnosis, prognosis, goals of PT, expectations, do's and don'ts, purpose of sling, when/how to wear  3. Right shoulder gentle PROM all planes following MD protocol  4. HEP (see below) ed on normal vs abnormal response    Access Code: [email or web address]  URL: [email or web address]  Date: 02/08/2024  Prepared by: Micaela  Arnoldo    Exercises  - Seated Upper Trapezius Stretch  - 2 x daily - 7 x weekly - 1 sets - 3 reps - 30 hold  - Seated Scapular Retraction  - 2 x daily - 7 x weekly - 2 sets - 10 reps  - Seated Cervical Retraction  - 2 x daily - 7 x weekly - 2 sets - 10 reps  - Seated Levator Scapulae Stretch (Mirrored)  - 2 x daily - 7 x weekly - 1 sets - 3 reps - 30 hold  - Staggered Stance Biceps Curl  - 2 x daily - 7 x weekly - 2 sets - 10 reps  - Horizontal Shoulder Pendulum with Table Support  - 2 x daily - 7 x weekly - 2 sets - 10 reps  - Flexion-Extension Shoulder Pendulum with Table Support  - 2 x daily - 7 x weekly - 2 sets - 10 reps

## 2024-02-12 ENCOUNTER — TREATMENT (OUTPATIENT)
Dept: PHYSICAL THERAPY | Facility: CLINIC | Age: 59
End: 2024-02-12
Payer: COMMERCIAL

## 2024-02-12 DIAGNOSIS — S46.011A ROTATOR CUFF STRAIN, RIGHT, INITIAL ENCOUNTER: Primary | ICD-10-CM

## 2024-02-12 DIAGNOSIS — S46.011D STRAIN OF ROTATOR CUFF, RIGHT, SUBSEQUENT ENCOUNTER: ICD-10-CM

## 2024-02-12 PROCEDURE — 97140 MANUAL THERAPY 1/> REGIONS: CPT | Mod: GP | Performed by: PHYSICAL THERAPIST

## 2024-02-12 PROCEDURE — 97110 THERAPEUTIC EXERCISES: CPT | Mod: GP | Performed by: PHYSICAL THERAPIST

## 2024-02-12 NOTE — PROGRESS NOTES
"PHYSICAL THERAPY   TREATMENT NOTE    Patient Name:  Rajiv Melvin   Patient MRN: [id number]  Date: 02/12/24    Time Calculation  Start Time: 1215  Stop Time: 1300  Time Calculation (min): 45 min    Insurance:  Insurance Type: [facility]  Visit number: 1   Approved # of visits 60  Authorization Needed: no      General   Reason for visit: s/p RTC & SAD 1/19/24   Referred by: Dr. Dunlap    Therapy diagnoses:   1. Rotator cuff strain, right, initial encounter        2. Strain of rotator cuff, right, subsequent encounter  Follow Up In Physical Therapy           Assessment:    Pt does well with progression of exercises per protocol.  Very stiff with right shoulder ER in scapular plane.  Good patient effort and technique with all exercises.     Plan:  Continue per protocol    Subjective  Pt states he is feeling pretty good and slept well last night.  Exercises going okay but has trouble with pendulum.  Better if he just lets the arm hang down without trying to move it.   - Pain (0-10): 2     Precautions    Fall Risk: no    Objective    Treatment Performed:   Therapeutic Exercise:  20 Minutes  - supine ER stretch with cane, towel roll under arm, SCAPULAR PLANE 10\"x10  - pendulum hang 60\"  - prone row AROM R 10x (leaning over mat table)  - cane right shoulder ext stretch 10x 10\"   - cane IR stretch behind back, up/down, in/out 5\"x10 each  - yellow band pull backs (small range) 10x  - active trap depression 10x     Manual Therapy: 25 minutes  - r shoulder PROM all planes within protocol (0-90 abduction, ER in scapular plane)  - gentle right shoulder oscillation, gentle traction  - gentle inf and poster GH mobs  - STM/MFR/TPR right upper trap, levator scap with patient sitting   Neuromuscular Re-education:   minutes    Gait Training:    minutes    Modalities: minutes      "

## 2024-02-15 ENCOUNTER — TREATMENT (OUTPATIENT)
Dept: PHYSICAL THERAPY | Facility: CLINIC | Age: 59
End: 2024-02-15
Payer: COMMERCIAL

## 2024-02-15 DIAGNOSIS — S46.011D STRAIN OF ROTATOR CUFF, RIGHT, SUBSEQUENT ENCOUNTER: ICD-10-CM

## 2024-02-15 PROCEDURE — 97110 THERAPEUTIC EXERCISES: CPT | Mod: GP | Performed by: PHYSICAL THERAPIST

## 2024-02-15 PROCEDURE — 97140 MANUAL THERAPY 1/> REGIONS: CPT | Mod: GP | Performed by: PHYSICAL THERAPIST

## 2024-02-15 NOTE — PROGRESS NOTES
"PHYSICAL THERAPY   TREATMENT NOTE    Patient Name:  Rajiv Melvin   Patient MRN: [id number]  Date: 02/15/24    Time Calculation  Start Time: 1330  Stop Time: 1417  Time Calculation (min): 47 min    Insurance:  Insurance Type: [facility]  Visit number: 3  Approved # of visits 60  Authorization Needed: no      General   Reason for visit: s/p RTC & SAD 1/19/24   Referred by: Dr. Dunlap    Therapy diagnoses:   1. Strain of rotator cuff, right, subsequent encounter  Follow Up In Physical Therapy           Assessment:    Progressed patient to AAROM per protocol as patient is 4 weeks post op tomorrow.  Pt does well with new exercises without c/o sharp pain but does report a lot of \"tightness\".  Pt declines ice but will perform at home.  Overall progressing well s/p rotator cuff repair.     Plan:  Continue per protocol- gradually progress AAROM  No PROM restrictions at this point except keep ER in scapular plane    Subjective  Pt states he finds himself being able to do more with his right arm.  Able to put on socks with both hands now and reached out for milk jug but didn't lift it. Still not comfortable lying flat.   - Pain (0-10): 2     Precautions    Fall Risk: no    Objective    PROM right shoulder  Flexion 90  Abd 90  ER 15  IR 40     Treatment Performed:   Therapeutic Exercise:  32 Minutes  - supine right shoulder flexion AAROM with left 10x 5\" hold  - supine ER stretch with cane, towel roll under arm, SCAPULAR PLANE 10\"x10  - supine cane AAROM chest press 10x  - OHP flexion 2 minutes, scaption 2 minutes  - table slides right flexion 15x  - table slides right scaption 15x   - prone row AROM R 15x (leaning over mat table)  - cane IR stretch behind back, up/down, in/out 5\"x10 each  - yellow band pull backs (small range) 25x      Manual Therapy: 15 minutes  - r shoulder PROM all planes within protocol (0-90 abduction, ER in scapular plane)  - gentle right shoulder oscillation, gentle traction  - gentle inf and poster GH " mobs    Neuromuscular Re-education:   minutes    Gait Training:    minutes    Modalities: minutes

## 2024-02-19 ENCOUNTER — TREATMENT (OUTPATIENT)
Dept: PHYSICAL THERAPY | Facility: CLINIC | Age: 59
End: 2024-02-19
Payer: COMMERCIAL

## 2024-02-19 DIAGNOSIS — S46.011D STRAIN OF ROTATOR CUFF, RIGHT, SUBSEQUENT ENCOUNTER: ICD-10-CM

## 2024-02-19 PROCEDURE — 97110 THERAPEUTIC EXERCISES: CPT | Mod: GP,CQ

## 2024-02-19 PROCEDURE — 97140 MANUAL THERAPY 1/> REGIONS: CPT | Mod: GP,CQ

## 2024-02-19 NOTE — PROGRESS NOTES
"PHYSICAL THERAPY   TREATMENT NOTE    Patient Name:  Rajiv Melvin   Patient MRN: [id number]  Date: 02/19/24    Time Calculation  Start Time: 1430  Stop Time: 1515  Time Calculation (min): 45 min    Insurance:  Insurance Type: [facility]  Visit number: 4  Approved # of visits 60  Authorization Needed: no      General   Reason for visit: s/p RTC & SAD 1/19/24   Referred by: Dr. Dunlap    Therapy diagnoses:   1. Strain of rotator cuff, right, subsequent encounter  Follow Up In Physical Therapy           Assessment:    Patient presented to session with c/o increased soreness in the R deltoid region as well as the posterolateral shoulder. No pain indicated with exercise; only limited mobility due to soft tissue tightness. Able to add AAROM standing cane abduction and flexion within tolerance; verbal cues for proper posture in order to avoid compensatory movement of the trunk. ER in scap plane with slight increase post manual intervention. Post session he expressed improved R shoulder mobility with less soreness.    Plan:  Continue per protocol- gradually progress AAROM  No PROM restrictions at this point except keep ER in scapular plane    Subjective  Pt states he is just so sore in the side of the R shoulder and sometimes along the shoulder blade.  - Pain (0-10): 2     Precautions  Fall Risk: no    Objective    Treatment Performed:   Therapeutic Exercise:  35 Minutes  - supine right shoulder flexion AAROM with left 10x 5\" hold  - supine ER stretch with cane, towel roll under arm, SCAPULAR PLANE 10\"x10  - supine cane AAROM chest press 10x2  - OHP flexion 2 minutes, scaption 2 minutes  - table slides right flexion 15x HEP  - table slides right scaption 15x HEP  - prone row AROM R 20x (leaning over mat table)  - cane IR stretch behind back, up/down, in/out 5\"x 20 each  - cane extension/flexion/abd to tolerance x 20  - yellow band pull backs (small range) 25x      Manual Therapy: 10 minutes  - r shoulder PROM all planes within " protocol (0-90 abduction, ER in scapular plane)  - gentle right shoulder oscillation, gentle traction  - gentle inf and poster GH mobs    Neuromuscular Re-education:   minutes    Gait Training:    minutes    Modalities: minutes

## 2024-02-21 ENCOUNTER — TREATMENT (OUTPATIENT)
Dept: PHYSICAL THERAPY | Facility: CLINIC | Age: 59
End: 2024-02-21
Payer: COMMERCIAL

## 2024-02-21 DIAGNOSIS — S46.011D STRAIN OF ROTATOR CUFF, RIGHT, SUBSEQUENT ENCOUNTER: ICD-10-CM

## 2024-02-21 PROCEDURE — 97110 THERAPEUTIC EXERCISES: CPT | Mod: GP,CQ

## 2024-02-21 PROCEDURE — 97140 MANUAL THERAPY 1/> REGIONS: CPT | Mod: GP,CQ

## 2024-02-21 NOTE — PROGRESS NOTES
"PHYSICAL THERAPY   TREATMENT NOTE    Patient Name:  Rajiv Melvin   Patient MRN: [id number]  Date: 02/21/24    Time Calculation  Start Time: 0920  Stop Time: 1005  Time Calculation (min): 45 min    Insurance:  Insurance Type: [facility]  Visit number: 5  Approved # of visits 60  Authorization Needed: no      General   Reason for visit: s/p RTC & SAD 1/19/24   Referred by: Dr. Dunlap    Therapy diagnoses:   1. Strain of rotator cuff, right, subsequent encounter  Follow Up In Physical Therapy           Assessment:    Patient presented to session with c/o increased soreness in the R deltoid region. Able to warm up on UBE with AAROM of the R UE without irritability. Able to begin light ER/IR shoulder isometrics using towel at wall; instructed on proper technique and to not push through pain. Manual interventions are assisting with improved R shoulder mobility in all planes. Updated HEP. Post session he displayed improved R shoulder mobility with less soreness.    Plan:  Continue per protocol- gradually progress AAROM  No PROM restrictions at this point except keep ER in scapular plane    Subjective  Pt states he is more sore in the side of the R shoulder.  - Pain (0-10): 2     Precautions  Fall Risk: no    Objective    Treatment Performed:   Therapeutic Exercise:  35 Minutes  - UBE 3' fwd/3' retro L1 passive with R UE  - supine right shoulder flexion AAROM with left 10x 5\" hold  - supine ER stretch with cane, towel roll under arm, SCAPULAR PLANE 10\"x10  - supine cane AAROM chest press 10x2  - OHP flexion 2 minutes, scaption 2 minutes  - table slides right flexion 15x HEP  - table slides right scaption 15x HEP  - prone row AROM R 20x (leaning over mat table)  - cane IR stretch behind back, up/down, in/out 5\"x 20 each  - cane extension/flexion/abd to tolerance x 20  - yellow band pull backs (small range) 25x  - shoulder ER/IR isometrics 5\" x 10 ea      Manual Therapy: 10 minutes  - r shoulder PROM all planes within protocol " (0-90 abduction, ER in scapular plane)  - gentle right shoulder oscillation, gentle traction  - gentle inf and poster GH mobs    Neuromuscular Re-education:   minutes    Gait Training:    minutes    Modalities: minutes

## 2024-02-26 ENCOUNTER — TREATMENT (OUTPATIENT)
Dept: PHYSICAL THERAPY | Facility: CLINIC | Age: 59
End: 2024-02-26
Payer: COMMERCIAL

## 2024-02-26 DIAGNOSIS — S46.011D STRAIN OF ROTATOR CUFF, RIGHT, SUBSEQUENT ENCOUNTER: ICD-10-CM

## 2024-02-26 PROCEDURE — 97140 MANUAL THERAPY 1/> REGIONS: CPT | Mod: GP | Performed by: PHYSICAL THERAPIST

## 2024-02-26 PROCEDURE — 97110 THERAPEUTIC EXERCISES: CPT | Mod: GP | Performed by: PHYSICAL THERAPIST

## 2024-02-26 NOTE — PROGRESS NOTES
"PHYSICAL THERAPY   TREATMENT NOTE    Patient Name:  Rajiv Melvin   Patient MRN: [id number]  Date: 02/26/24    Time Calculation  Start Time: 1500  Stop Time: 1548  Time Calculation (min): 48 min    Insurance:  Insurance Type: [facility]  Visit number: 6  Approved # of visits 60  Authorization Needed: no      General   Reason for visit: s/p RTC & SAD 1/19/24   Referred by: Dr. Dunlap    Therapy diagnoses:   1. Strain of rotator cuff, right, subsequent encounter  Follow Up In Physical Therapy           Assessment:    Patient slowly progressing with function, pain control, and ROM.  Tightness at end range PROM all planes.   Progressed AAROM and stretching into ER/IR today.      Plan:  Continue per protocol- gradually progress AAROM  Pt is 6 weeks post op on Friday 3/1/24. Progress to AROM if appropriate.     Subjective  Pt states his shoulder still hurts but overall happy with how it is progressing.  Able to do more and more with it but sleeping still difficult.  Exercises are going well.   - Pain (0-10): 2     Precautions  Fall Risk: no    Objective    Right shoulder PROM  Flexion 140  Abd 115  ER 25  IR 50     Treatment Performed:   Therapeutic Exercise:  30 Minutes  - UBE 3' fwd/3' retro L1 passive with R UE  - OHP flexion 2 minutes, scaption 2 minutes  - wall walk right flexion 5x  - bilateral shoulder flexion with towel 5x  - door stretch low 10\"x3  - ER door stretch at 0 degrees abduction right 10\" x 5  - HBB stretch with green strap 10\"x5       Manual Therapy: 18 minutes  - r shoulder PROM all planes within protocol (0-90 abduction, ER in scapular plane)  - gentle right shoulder oscillation, gentle traction  - gentle inf and poster GH mobs    Neuromuscular Re-education:   minutes    Gait Training:    minutes    Modalities: minutes  "

## 2024-02-27 DIAGNOSIS — E78.41 ELEVATED LIPOPROTEIN(A): ICD-10-CM

## 2024-02-27 RX ORDER — EZETIMIBE 10 MG/1
10 TABLET ORAL DAILY
Qty: 90 TABLET | Refills: 1 | Status: SHIPPED | OUTPATIENT
Start: 2024-02-27

## 2024-03-01 ENCOUNTER — TREATMENT (OUTPATIENT)
Dept: PHYSICAL THERAPY | Facility: CLINIC | Age: 59
End: 2024-03-01
Payer: COMMERCIAL

## 2024-03-01 DIAGNOSIS — G89.29 CHRONIC RIGHT SHOULDER PAIN: Primary | ICD-10-CM

## 2024-03-01 DIAGNOSIS — S46.011D STRAIN OF ROTATOR CUFF, RIGHT, SUBSEQUENT ENCOUNTER: ICD-10-CM

## 2024-03-01 DIAGNOSIS — M25.511 CHRONIC RIGHT SHOULDER PAIN: Primary | ICD-10-CM

## 2024-03-01 PROCEDURE — 97140 MANUAL THERAPY 1/> REGIONS: CPT | Mod: GP | Performed by: PHYSICAL THERAPIST

## 2024-03-01 PROCEDURE — 97110 THERAPEUTIC EXERCISES: CPT | Mod: GP | Performed by: PHYSICAL THERAPIST

## 2024-03-01 NOTE — PROGRESS NOTES
"PHYSICAL THERAPY   TREATMENT NOTE    Patient Name:  Rajiv Melvin   Patient MRN: [id number]  Date: 03/01/24    Time Calculation  Start Time: 1233  Stop Time: 1313  Time Calculation (min): 40 min    Insurance:  Insurance Type: [facility]  Visit number: 7  Approved # of visits 60  Authorization Needed: no      General   Reason for visit: s/p RTC & SAD 1/19/24   Referred by: Dr. Dunlap    Therapy diagnoses:   1. Chronic right shoulder pain  Follow Up In Physical Therapy      2. Strain of rotator cuff, right, subsequent encounter  Follow Up In Physical Therapy             Assessment:    Pt has limited PROM in R shoulder still as expected at this point post op. Encouraged him to continue to focus on end range stretching with his HEP, as well as to start to introduce active flex/abd at home avoiding UT compensation.     Plan:  Continue per protocol- gradually progress AROM as tolerated, as well as scapular stability, watching for UT compensation      Subjective  HEP is going well, doing them a few times a day.   - Pain (0-10): 1.5/10 R shoulder    Precautions  Fall Risk: no    Objective  6 weeks post op today (3/1)  Treatment Performed:   Therapeutic Exercise:  25 Minutes  - Pulleys x 3' flex / 3' abd  - wall walk shoulder flexion x 10 R  - wall walk shoulder scaption x 10 R   - prone I's x 15 x 2  - prone T's x 15 x 2  - prone rows x 15 x 2  - doorway pec stretch x 30\" low/mod/high    Manual Therapy: 15 minutes  - R shoulder PROM  - STM/DTM R pec, subscap, UT, supraspinatus  - R GH jt mobs gr 2-3 posterior and inferior  "

## 2024-03-04 ENCOUNTER — TREATMENT (OUTPATIENT)
Dept: PHYSICAL THERAPY | Facility: CLINIC | Age: 59
End: 2024-03-04
Payer: COMMERCIAL

## 2024-03-04 DIAGNOSIS — S46.011D STRAIN OF ROTATOR CUFF, RIGHT, SUBSEQUENT ENCOUNTER: ICD-10-CM

## 2024-03-04 PROCEDURE — 97110 THERAPEUTIC EXERCISES: CPT | Mod: GP,CQ

## 2024-03-04 PROCEDURE — 97140 MANUAL THERAPY 1/> REGIONS: CPT | Mod: GP,CQ

## 2024-03-04 NOTE — PROGRESS NOTES
"PHYSICAL THERAPY   TREATMENT NOTE    Patient Name:  Rajvi Melvin   Patient MRN: [id number]  Date: 03/04/24    Time Calculation  Start Time: 1305  Stop Time: 1350  Time Calculation (min): 45 min    Insurance:  Insurance Type: [facility]  Visit number: 8  Approved # of visits 60  Authorization Needed: no      General   Reason for visit: s/p RTC & SAD 1/19/24   Referred by: Dr. Dunlap    Therapy diagnoses:   1. Strain of rotator cuff, right, subsequent encounter  Follow Up In Physical Therapy        Assessment:    Reviewed side lying sleeping position on his R side with pillow under the shoulder and head for proper support; will trial at home. Did advise him to try his TENS unit or icing before bed which may calm his irritability at night. Progressed with side lying exercises for ROM and scapular endurance which was challenging. Pain still limits his ROM in all planes, however, is showing improvement with his mobility overall. Updated HEP.    Plan:  Continue per protocol- gradually progress AROM as tolerated, as well as scapular stability, watching for UT compensation    Subjective  Patient states he has more achiness/soreness in the R shoulder vs pain. Has a hard time sleeping still in a comfortable position.  - Pain (0-10): soreness R shoulder    Precautions  Fall Risk: no    Objective    Treatment Performed:   Therapeutic Exercise:  37 Minutes  - Access Code: [email or web address]   - Pulleys x 3' flex / 3' abd  - wall slide shoulder flexion x 10 R  - wall slide shoulder scaption x 10 R   - prone I's x 15 x 2  - prone T's x 15 x 2  - prone rows x 15 x 2  - doorway pec stretch x 30\" low/mod/high  - side lying alphabet x 1  - side lying abd 2 x 10  - side lying ER 2 x 10    Manual Therapy: 8 minutes  - R shoulder PROM  - STM/DTM R pec, subscap, UT, supraspinatus  - R GH jt mobs gr 1-2 posterior and inferior  "

## 2024-03-08 ENCOUNTER — LAB (OUTPATIENT)
Dept: LAB | Facility: LAB | Age: 59
End: 2024-03-08
Payer: COMMERCIAL

## 2024-03-08 ENCOUNTER — OFFICE VISIT (OUTPATIENT)
Dept: PRIMARY CARE | Facility: CLINIC | Age: 59
End: 2024-03-08
Payer: COMMERCIAL

## 2024-03-08 ENCOUNTER — TREATMENT (OUTPATIENT)
Dept: PHYSICAL THERAPY | Facility: CLINIC | Age: 59
End: 2024-03-08
Payer: COMMERCIAL

## 2024-03-08 ENCOUNTER — APPOINTMENT (OUTPATIENT)
Dept: ORTHOPEDIC SURGERY | Facility: CLINIC | Age: 59
End: 2024-03-08
Payer: COMMERCIAL

## 2024-03-08 VITALS
SYSTOLIC BLOOD PRESSURE: 128 MMHG | OXYGEN SATURATION: 97 % | DIASTOLIC BLOOD PRESSURE: 74 MMHG | WEIGHT: 200.8 LBS | TEMPERATURE: 97.6 F | HEIGHT: 70 IN | BODY MASS INDEX: 28.75 KG/M2 | HEART RATE: 65 BPM

## 2024-03-08 DIAGNOSIS — G89.29 CHRONIC RIGHT SHOULDER PAIN: Primary | ICD-10-CM

## 2024-03-08 DIAGNOSIS — I25.119 CORONARY ARTERY DISEASE WITH ANGINA PECTORIS, UNSPECIFIED VESSEL OR LESION TYPE, UNSPECIFIED WHETHER NATIVE OR TRANSPLANTED HEART (CMS-HCC): ICD-10-CM

## 2024-03-08 DIAGNOSIS — M25.511 CHRONIC RIGHT SHOULDER PAIN: Primary | ICD-10-CM

## 2024-03-08 DIAGNOSIS — E78.5 HYPERLIPIDEMIA, UNSPECIFIED HYPERLIPIDEMIA TYPE: ICD-10-CM

## 2024-03-08 DIAGNOSIS — S46.011D STRAIN OF ROTATOR CUFF, RIGHT, SUBSEQUENT ENCOUNTER: ICD-10-CM

## 2024-03-08 DIAGNOSIS — Z00.00 WELL ADULT EXAM: ICD-10-CM

## 2024-03-08 DIAGNOSIS — E66.3 OVERWEIGHT WITH BODY MASS INDEX (BMI) OF 28 TO 28.9 IN ADULT: ICD-10-CM

## 2024-03-08 DIAGNOSIS — Z00.00 WELL ADULT EXAM: Primary | ICD-10-CM

## 2024-03-08 DIAGNOSIS — E78.41 ELEVATED LIPOPROTEIN(A): ICD-10-CM

## 2024-03-08 LAB
ALBUMIN SERPL BCP-MCNC: 4.4 G/DL (ref 3.4–5)
ALP SERPL-CCNC: 50 U/L (ref 33–120)
ALT SERPL W P-5'-P-CCNC: 53 U/L (ref 10–52)
ANION GAP SERPL CALC-SCNC: 12 MMOL/L (ref 10–20)
AST SERPL W P-5'-P-CCNC: 24 U/L (ref 9–39)
BASOPHILS # BLD AUTO: 0.03 X10*3/UL (ref 0–0.1)
BASOPHILS NFR BLD AUTO: 0.5 %
BILIRUB SERPL-MCNC: 0.6 MG/DL (ref 0–1.2)
BUN SERPL-MCNC: 14 MG/DL (ref 6–23)
CALCIUM SERPL-MCNC: 9.9 MG/DL (ref 8.6–10.6)
CHLORIDE SERPL-SCNC: 102 MMOL/L (ref 98–107)
CHOLEST SERPL-MCNC: 142 MG/DL (ref 0–199)
CHOLESTEROL/HDL RATIO: 3.1
CO2 SERPL-SCNC: 30 MMOL/L (ref 21–32)
CREAT SERPL-MCNC: 0.84 MG/DL (ref 0.5–1.3)
EGFRCR SERPLBLD CKD-EPI 2021: >90 ML/MIN/1.73M*2
EOSINOPHIL # BLD AUTO: 0.12 X10*3/UL (ref 0–0.7)
EOSINOPHIL NFR BLD AUTO: 2 %
ERYTHROCYTE [DISTWIDTH] IN BLOOD BY AUTOMATED COUNT: 12.7 % (ref 11.5–14.5)
GLUCOSE SERPL-MCNC: 115 MG/DL (ref 74–99)
HCT VFR BLD AUTO: 43.1 % (ref 41–52)
HDLC SERPL-MCNC: 46.1 MG/DL
HGB BLD-MCNC: 13.7 G/DL (ref 13.5–17.5)
IMM GRANULOCYTES # BLD AUTO: 0.01 X10*3/UL (ref 0–0.7)
IMM GRANULOCYTES NFR BLD AUTO: 0.2 % (ref 0–0.9)
LDLC SERPL CALC-MCNC: 67 MG/DL
LYMPHOCYTES # BLD AUTO: 2.39 X10*3/UL (ref 1.2–4.8)
LYMPHOCYTES NFR BLD AUTO: 39.6 %
MCH RBC QN AUTO: 28.1 PG (ref 26–34)
MCHC RBC AUTO-ENTMCNC: 31.8 G/DL (ref 32–36)
MCV RBC AUTO: 88 FL (ref 80–100)
MONOCYTES # BLD AUTO: 0.52 X10*3/UL (ref 0.1–1)
MONOCYTES NFR BLD AUTO: 8.6 %
NEUTROPHILS # BLD AUTO: 2.96 X10*3/UL (ref 1.2–7.7)
NEUTROPHILS NFR BLD AUTO: 49.1 %
NON HDL CHOLESTEROL: 96 MG/DL (ref 0–149)
NRBC BLD-RTO: 0 /100 WBCS (ref 0–0)
PLATELET # BLD AUTO: 229 X10*3/UL (ref 150–450)
POTASSIUM SERPL-SCNC: 4.3 MMOL/L (ref 3.5–5.3)
PROT SERPL-MCNC: 6.9 G/DL (ref 6.4–8.2)
PSA SERPL-MCNC: 2.45 NG/ML
RBC # BLD AUTO: 4.88 X10*6/UL (ref 4.5–5.9)
RBC #/AREA URNS AUTO: NORMAL /HPF
SODIUM SERPL-SCNC: 140 MMOL/L (ref 136–145)
TRIGL SERPL-MCNC: 145 MG/DL (ref 0–149)
VLDL: 29 MG/DL (ref 0–40)
WBC # BLD AUTO: 6 X10*3/UL (ref 4.4–11.3)
WBC #/AREA URNS AUTO: NORMAL /HPF

## 2024-03-08 PROCEDURE — 1036F TOBACCO NON-USER: CPT | Performed by: FAMILY MEDICINE

## 2024-03-08 PROCEDURE — 97110 THERAPEUTIC EXERCISES: CPT | Mod: GP | Performed by: PHYSICAL THERAPIST

## 2024-03-08 PROCEDURE — 97140 MANUAL THERAPY 1/> REGIONS: CPT | Mod: GP | Performed by: PHYSICAL THERAPIST

## 2024-03-08 PROCEDURE — 84153 ASSAY OF PSA TOTAL: CPT

## 2024-03-08 PROCEDURE — 36415 COLL VENOUS BLD VENIPUNCTURE: CPT

## 2024-03-08 PROCEDURE — 81001 URINALYSIS AUTO W/SCOPE: CPT

## 2024-03-08 PROCEDURE — 3008F BODY MASS INDEX DOCD: CPT | Performed by: FAMILY MEDICINE

## 2024-03-08 PROCEDURE — 80053 COMPREHEN METABOLIC PANEL: CPT

## 2024-03-08 PROCEDURE — 80061 LIPID PANEL: CPT

## 2024-03-08 PROCEDURE — 85025 COMPLETE CBC W/AUTO DIFF WBC: CPT

## 2024-03-08 PROCEDURE — 99396 PREV VISIT EST AGE 40-64: CPT | Performed by: FAMILY MEDICINE

## 2024-03-08 RX ORDER — ASPIRIN 81 MG/1
81 TABLET ORAL DAILY
Qty: 90 TABLET | Refills: 1 | Status: SHIPPED | OUTPATIENT
Start: 2024-03-08

## 2024-03-08 RX ORDER — ROSUVASTATIN CALCIUM 40 MG/1
40 TABLET, COATED ORAL DAILY
Qty: 90 TABLET | Refills: 3 | Status: SHIPPED | OUTPATIENT
Start: 2024-03-08

## 2024-03-08 RX ORDER — ALBUTEROL SULFATE 0.83 MG/ML
2.5 SOLUTION RESPIRATORY (INHALATION)
COMMUNITY
Start: 2024-01-19 | End: 2024-03-08 | Stop reason: WASHOUT

## 2024-03-08 RX ORDER — SODIUM CHLORIDE, SODIUM LACTATE, POTASSIUM CHLORIDE, CALCIUM CHLORIDE 600; 310; 30; 20 MG/100ML; MG/100ML; MG/100ML; MG/100ML
100 INJECTION, SOLUTION INTRAVENOUS
COMMUNITY
Start: 2024-01-19 | End: 2024-03-08 | Stop reason: ALTCHOICE

## 2024-03-08 RX ORDER — LABETALOL HYDROCHLORIDE 5 MG/ML
5 INJECTION, SOLUTION INTRAVENOUS
COMMUNITY
Start: 2024-01-19 | End: 2024-03-08 | Stop reason: ALTCHOICE

## 2024-03-08 RX ORDER — CLOPIDOGREL BISULFATE 75 MG/1
TABLET ORAL
COMMUNITY
End: 2024-03-08 | Stop reason: WASHOUT

## 2024-03-08 RX ORDER — MORPHINE SULFATE 2 MG/ML
2 INJECTION, SOLUTION INTRAMUSCULAR; INTRAVENOUS
COMMUNITY
Start: 2024-01-19 | End: 2024-03-08 | Stop reason: WASHOUT

## 2024-03-08 RX ORDER — DIPHENHYDRAMINE HYDROCHLORIDE 50 MG/ML
25 INJECTION INTRAMUSCULAR; INTRAVENOUS
COMMUNITY
Start: 2024-01-19 | End: 2024-03-08 | Stop reason: ALTCHOICE

## 2024-03-08 RX ORDER — HYDROMORPHONE HYDROCHLORIDE 1 MG/ML
1 INJECTION, SOLUTION INTRAMUSCULAR; INTRAVENOUS; SUBCUTANEOUS
COMMUNITY
Start: 2024-01-19 | End: 2024-03-08 | Stop reason: ALTCHOICE

## 2024-03-08 RX ORDER — MIDAZOLAM HYDROCHLORIDE 1 MG/ML
1 INJECTION, SOLUTION INTRAMUSCULAR; INTRAVENOUS
COMMUNITY
Start: 2024-01-19 | End: 2024-03-08 | Stop reason: WASHOUT

## 2024-03-08 RX ORDER — MEPERIDINE HYDROCHLORIDE 25 MG/ML
12.5 INJECTION INTRAMUSCULAR; INTRAVENOUS; SUBCUTANEOUS
COMMUNITY
Start: 2024-01-19 | End: 2024-03-08 | Stop reason: ALTCHOICE

## 2024-03-08 RX ORDER — HYDRALAZINE HYDROCHLORIDE 20 MG/ML
5 INJECTION INTRAMUSCULAR; INTRAVENOUS
COMMUNITY
Start: 2024-01-19 | End: 2024-03-08 | Stop reason: ALTCHOICE

## 2024-03-08 ASSESSMENT — ENCOUNTER SYMPTOMS
RESPIRATORY NEGATIVE: 1
ENDOCRINE NEGATIVE: 1
CARDIOVASCULAR NEGATIVE: 1
GASTROINTESTINAL NEGATIVE: 1
MUSCULOSKELETAL NEGATIVE: 1
OCCASIONAL FEELINGS OF UNSTEADINESS: 0
DEPRESSION: 0
CONSTITUTIONAL NEGATIVE: 1
LOSS OF SENSATION IN FEET: 0
NEUROLOGICAL NEGATIVE: 1
PSYCHIATRIC NEGATIVE: 1

## 2024-03-08 ASSESSMENT — PAIN SCALES - GENERAL: PAINLEVEL: 1

## 2024-03-08 NOTE — PROGRESS NOTES
"Subjective   Patient ID: Rajiv Melvin is a 58 y.o. male who presents for Annual Exam (Annual cpe fasting bw).    HPI     Review of Systems   Constitutional: Negative.    HENT: Negative.     Respiratory: Negative.     Cardiovascular: Negative.    Gastrointestinal: Negative.    Endocrine: Negative.    Genitourinary: Negative.    Musculoskeletal: Negative.         Recent right rotator cuff repair Dr. Dunlap   Neurological: Negative.    Psychiatric/Behavioral: Negative.         Objective   /74 (BP Location: Left arm)   Pulse 65   Temp 36.4 °C (97.6 °F) (Temporal)   Ht 1.778 m (5' 10\")   Wt 91.1 kg (200 lb 12.8 oz)   SpO2 97%   BMI 28.81 kg/m²     Physical Exam  Vitals and nursing note reviewed.   Constitutional:       Appearance: Normal appearance.   HENT:      Right Ear: Tympanic membrane normal.      Left Ear: Tympanic membrane normal.   Cardiovascular:      Rate and Rhythm: Normal rate and regular rhythm.      Pulses: Normal pulses.      Heart sounds: Normal heart sounds.   Pulmonary:      Breath sounds: Normal breath sounds.   Abdominal:      Palpations: Abdomen is soft.   Musculoskeletal:         General: Normal range of motion.   Neurological:      General: No focal deficit present.      Mental Status: He is alert and oriented to person, place, and time.   Psychiatric:         Mood and Affect: Mood normal.         Behavior: Behavior normal.         Assessment/Plan patient seen here for a annual physical.  He is feeling well.  We reviewed and renewed his medications.  He is having his lab work drawn today.  Will see him back in a year  Problem List Items Addressed This Visit             ICD-10-CM    CAD (coronary artery disease) I25.10    Relevant Medications    aspirin 81 mg EC tablet    Hyperlipidemia E78.5    Relevant Medications    rosuvastatin (Crestor) 40 mg tablet    Overweight with body mass index (BMI) of 28 to 28.9 in adult E66.3, Z68.28     Other Visit Diagnoses         Codes    Well adult exam "    -  Primary Z00.00    Relevant Orders    Prostate Specific Antigen    Lipid Panel    Comprehensive Metabolic Panel    CBC and Auto Differential    Microscopic Only, Urine

## 2024-03-08 NOTE — PROGRESS NOTES
"PHYSICAL THERAPY   TREATMENT NOTE    Patient Name:  Rajiv Melvin   Patient MRN: [id number]  Date: 03/08/24    Time Calculation  Start Time: 0115  Stop Time: 0155  Time Calculation (min): 40 min    Insurance:  Insurance Type: [facility]  Visit number: 9  Approved # of visits 60  Authorization Needed: no      General   Reason for visit: s/p RTC & SAD   DOS 1/19/24   Referred by: Dr. Dunlap    Therapy diagnoses:   1. Chronic right shoulder pain        2. Strain of rotator cuff, right, subsequent encounter  Follow Up In Physical Therapy        Assessment:    Pt does well with exercises today - no increased pain and demonstrates fair ability to avoid UT elevation with AROM, although still limited in range, as expected at this point post op. Encouraged him to continue to stretch to end ranges as able.     Plan:  Continue per protocol- gradually progress AROM as tolerated, as well as scapular stability, watching for UT compensation      Subjective  No new changes, night time is still tough. Wasn't as good with his exercises this week because he was out of town for work.  - Pain (0-10): R shoulder 1/10    Precautions  Fall Risk: no    Objective  HEP Access Code: [email or web address]   7 weeks post op today  Treatment Performed:   Therapeutic Exercise:  30 Minutes  - Pulleys x 3' flex / 3' abd  - Shoulder isometrics into wall x 10\" x 2' each:  flexion, abduction, ER, extension, IR  - Shoulder flexion AROM x 15 B  - shoulder abduction AROM x 15 B  - B shoulder ER 3# x 20 B  - blue ball to wall circles x 30\" CW/CCW R    Manual Therapy: 10 minutes  - R shoulder PROM  - STM/DTM R pec, subscap, UT, supraspinatus  - R GH jt mobs gr 1-2 posterior and inferior  "

## 2024-03-12 ENCOUNTER — OFFICE VISIT (OUTPATIENT)
Dept: ORTHOPEDIC SURGERY | Facility: CLINIC | Age: 59
End: 2024-03-12
Payer: COMMERCIAL

## 2024-03-12 ENCOUNTER — TREATMENT (OUTPATIENT)
Dept: PHYSICAL THERAPY | Facility: CLINIC | Age: 59
End: 2024-03-12
Payer: COMMERCIAL

## 2024-03-12 DIAGNOSIS — M25.511 CHRONIC RIGHT SHOULDER PAIN: Primary | ICD-10-CM

## 2024-03-12 DIAGNOSIS — S46.011D STRAIN OF ROTATOR CUFF, RIGHT, SUBSEQUENT ENCOUNTER: ICD-10-CM

## 2024-03-12 DIAGNOSIS — G89.29 CHRONIC RIGHT SHOULDER PAIN: Primary | ICD-10-CM

## 2024-03-12 DIAGNOSIS — M25.511 RIGHT SHOULDER PAIN: Primary | ICD-10-CM

## 2024-03-12 PROCEDURE — 97110 THERAPEUTIC EXERCISES: CPT | Mod: GP | Performed by: PHYSICAL THERAPIST

## 2024-03-12 PROCEDURE — 99024 POSTOP FOLLOW-UP VISIT: CPT | Performed by: ORTHOPAEDIC SURGERY

## 2024-03-12 PROCEDURE — 3008F BODY MASS INDEX DOCD: CPT | Performed by: ORTHOPAEDIC SURGERY

## 2024-03-12 PROCEDURE — 1036F TOBACCO NON-USER: CPT | Performed by: ORTHOPAEDIC SURGERY

## 2024-03-12 PROCEDURE — 97140 MANUAL THERAPY 1/> REGIONS: CPT | Mod: GP | Performed by: PHYSICAL THERAPIST

## 2024-03-12 NOTE — PROGRESS NOTES
PHYSICAL THERAPY   TREATMENT NOTE    Patient Name:  Rajiv Melvin   Patient MRN: [id number]  Date: 03/12/24    Time Calculation  Start Time: 0915  Stop Time: 1004  Time Calculation (min): 49 min    Insurance:  Insurance Type: [facility]  Visit number: 10  Approved # of visits 60  Authorization Needed: no      General   Reason for visit: s/p RTC & SAD   DOS 1/19/24   Referred by: Dr. Dunlap    Therapy diagnoses:   1. Right shoulder pain        2. Strain of rotator cuff, right, subsequent encounter  Follow Up In Physical Therapy        Assessment:    Pt making excellent progress with right shoulder strength, ROM, and pain control.  Sleeping is still limited due to discomfort and pain.  Pt achieves good muscle fatigue with progression of strength exercises today without c/o pain.  Pt is a good candidate for continued skilled PT to address remaining deficits, navigate post partum restrictions/progressions, and return to normal ADL's.     Plan:  Follow up with progression of strength exercises  Continue to progress ROM, flexibility, strength, etc.       Subjective  Pt had his post op appt with surgeon who was pleased with progress. Pt was running up steps at end of last week and tripped at the top.  Did not fall or land hard on his shoulders but had to brace himself with right arm.  Shoulder has been a little more sore since then but MD was not concerned.  Night time is still difficult.   - Pain (0-10): R shoulder 0 /10 just soreness     Precautions  Fall Risk: no    Objective  HEP Access Code: [email or web address]     Right shoulder PROM  Abd 130  Flexion 140  ER 25  IR 45    Right shoulder AROM  Abd 158  Flexion 145  ER at 0 abd 25  IR functional reach to belt line     Quick DASH = 25    Strength right shoulder  Flexion 3+  Abduction 3+  ER 4-  IR 4       Treatment Performed:   Therapeutic Exercise:  34 Minutes  - UBE 3/3 L3 manual   - recheck  - Shoulder flexion and abduction AROM review with ed on using mirror  - wall push ups 10x  2  - table plank with alt table taps 5x R/L  - yellow band right shoulder ER 15x, IR 15x  - yellow band right shoulder flexion 10x, abduction 10x  - green band B rows 15x  - green band B shoulder ext 15x   - education/discussion on using arm for normal ADL's at this point but no heavy lifting away from body      Manual Therapy: 15 minutes  - R shoulder PROM  - STM/DTM R pec, subscap, UT, supraspinatus  - R GH jt mobs gr 1-2 posterior and inferior

## 2024-03-13 NOTE — PROGRESS NOTES
58-year-old is seen following right shoulder arthroscopy with rotator cuff repair and subacromial decompression and extensive debridement on January 19, 2024.  He continues to make progress.    Passive forward flexion 95 degrees and external rotation 10 degrees.    He is progressing well.  Precautions were emphasized.  He will continue with physical therapy and follow-up in 2 months with x-rays.

## 2024-03-14 ENCOUNTER — TREATMENT (OUTPATIENT)
Dept: PHYSICAL THERAPY | Facility: CLINIC | Age: 59
End: 2024-03-14
Payer: COMMERCIAL

## 2024-03-14 DIAGNOSIS — M25.511 RIGHT SHOULDER PAIN: Primary | ICD-10-CM

## 2024-03-14 DIAGNOSIS — S46.011D STRAIN OF ROTATOR CUFF, RIGHT, SUBSEQUENT ENCOUNTER: ICD-10-CM

## 2024-03-14 PROCEDURE — 97140 MANUAL THERAPY 1/> REGIONS: CPT | Mod: GP | Performed by: PHYSICAL THERAPIST

## 2024-03-14 PROCEDURE — 97110 THERAPEUTIC EXERCISES: CPT | Mod: GP | Performed by: PHYSICAL THERAPIST

## 2024-03-14 NOTE — PROGRESS NOTES
"PHYSICAL THERAPY   TREATMENT NOTE    Patient Name:  Rajiv Melvin   Patient MRN: [id number]  Date: 03/14/24    Time Calculation  Start Time: 0828  Stop Time: 0918  Time Calculation (min): 50 min    Insurance:  Insurance Type: [facility]  Visit number: 11  Approved # of visits 60  Authorization Needed: no      General   Reason for visit: s/p RTC & SAD   DOS 1/19/24   Referred by: Dr. Dunlap    Therapy diagnoses:   1. Right shoulder pain        2. Strain of rotator cuff, right, subsequent encounter  Follow Up In Physical Therapy        Assessment:    Trial of anterior joint GH mobs to right shoulder with patient in prone along with TPR to posterior shoulder musculature to see if ER range increases. No sig change this date but will continue in future.  Instructed patient to hold off on resisted theraband flexion and abduction for now due to more superior shoulder pain.  Okay to continue with rows, ext, IR/ER resistance.     Plan:  Follow up with superior right shoulder pain and discontinuing theraband flexion and abduction at home       Subjective  Pt states he slept a little bit better but does feel some discomfort to the top of his shoulder which is new for him.    - Pain (0-10): R shoulder 0 /10 just soreness     Precautions  Fall Risk: no    Objective    Treatment Performed:   Therapeutic Exercise:  30 Minutes  - UBE 3/3 L3 hills  - supine chicken wings (gentle) 5x  - review black tubing bilateral shoulder rows, extension 20x each  - black tubing right shoulder IR 10x  - red tubing right shoulder ER 10x  - 1# DB raise and hold flexion and abduction 5x R/L  - door stretch low 15\"x 3  - door stretch high 15\" x 3  - Shoulder flexion and abduction AROM review with ed on using mirror  - sidelying right shoulder blade protraction retraction at 90 degrees flexion  - sidelying right shoulder open book 10x  - supine SA punches 0# 20x      Manual Therapy: 20 minutes  - R shoulder PROM  - STM/DTM R pec, subscap, UT, " supraspinatus  - R GH jt mobs gr 1-2 posterior and inferior  - R GH anterior mobs with patient prone and TPR to posterior shoulder musculature

## 2024-03-18 ENCOUNTER — TREATMENT (OUTPATIENT)
Dept: PHYSICAL THERAPY | Facility: CLINIC | Age: 59
End: 2024-03-18
Payer: COMMERCIAL

## 2024-03-18 DIAGNOSIS — G89.29 CHRONIC RIGHT SHOULDER PAIN: ICD-10-CM

## 2024-03-18 DIAGNOSIS — M25.511 CHRONIC RIGHT SHOULDER PAIN: ICD-10-CM

## 2024-03-18 PROCEDURE — 97140 MANUAL THERAPY 1/> REGIONS: CPT | Mod: GP,CQ

## 2024-03-18 PROCEDURE — 97110 THERAPEUTIC EXERCISES: CPT | Mod: GP,CQ

## 2024-03-18 NOTE — PROGRESS NOTES
"PHYSICAL THERAPY   TREATMENT NOTE    Patient Name:  Rajiv Melvin   Patient MRN: [id number]  Date: 03/18/24    Time Calculation  Start Time: 1300  Stop Time: 1345  Time Calculation (min): 45 min    Insurance:  Insurance Type: [facility]  Visit number: 12  Approved # of visits 60  Authorization Needed: no    General   Reason for visit: s/p RTC & SAD   DOS 1/19/24   Referred by: Dr. Dunlap    Therapy diagnoses:   1. Chronic right shoulder pain  Follow Up In Physical Therapy        Assessment:    Patient still exhibits some overall soreness throughout exercise with limited ranges of motion. Encouraged patient to continue with use of wand at home to stretch his R shoulder. Good tolerance of joint mobilizations in supine and prone with minimal change in ROM, but not significant. Strengthening and AROM is improving with minimal verbal cues for technique. Updated HEP with wand ER.    Plan:  Continue with use of wand for HEP ROM.    Subjective  Pt states he still has trouble sleeping at night because of pain in the R shoulder; has less soreness on the top since stopping theraband flex/abd.  - Pain (0-10): R shoulder 0 /10 pain; mostly soreness     Precautions  Fall Risk: no    Objective    Treatment Performed:   Therapeutic Exercise:  35 Minutes  - Access Code: [email or web address]   - UBE 3/3 L3 hills  - supine chicken wings (gentle) 20x  - black tubing bilateral shoulder rows, extension 20x each  - black tubing right shoulder IR 10x2  - black tubing right shoulder ER 10x2  - 1# DB raise and hold flexion and abduction 2 x 10 R/L  - door stretch low 15\"x 3  - door stretch high 15\" x 3  - sidelying right shoulder blade protraction retraction at 90 degrees flexion x 20  - sidelying right shoulder open book 10x  - supine SA punches 0# 20x    Manual Therapy: 10 minutes  - R shoulder PROM  - STM/DTM R pec, subscap, UT, supraspinatus  - R GH jt mobs gr 1-2 posterior and inferior  - R GH anterior mobs with patient prone and TPR to posterior " shoulder musculature

## 2024-03-21 NOTE — PATIENT INSTRUCTIONS
1.  Chronic right shoulder pain slowly worsening in recent years.  Exam is consistent with an anterior labral tear.  Would advise on conservative management first, get a baseline x-ray.  Start physical therapy.  If no improvement with physical therapy over the next several weeks we would consider MRI or orthopedic referral.    Alden Funes DO    for Physical therapy orders can call [phone]     Stable on pantoprazole

## 2024-03-25 ENCOUNTER — TREATMENT (OUTPATIENT)
Dept: PHYSICAL THERAPY | Facility: CLINIC | Age: 59
End: 2024-03-25
Payer: COMMERCIAL

## 2024-03-25 DIAGNOSIS — G89.29 CHRONIC RIGHT SHOULDER PAIN: ICD-10-CM

## 2024-03-25 DIAGNOSIS — M25.511 CHRONIC RIGHT SHOULDER PAIN: ICD-10-CM

## 2024-03-25 PROCEDURE — 97140 MANUAL THERAPY 1/> REGIONS: CPT | Mod: GP,CQ

## 2024-03-25 PROCEDURE — 97110 THERAPEUTIC EXERCISES: CPT | Mod: GP,CQ

## 2024-03-25 NOTE — PROGRESS NOTES
"PHYSICAL THERAPY   TREATMENT NOTE    Patient Name:  Rajiv Melvin   Patient MRN: [id number]  Date: 03/25/24    Time Calculation  Start Time: 1300  Stop Time: 1345  Time Calculation (min): 45 min    Insurance:  Insurance Type: [facility]  Visit number: 13  Approved # of visits 60  Authorization Needed: no    General   Reason for visit: s/p RTC & SAD   DOS 1/19/24   Referred by: Dr. Dunlap    Therapy diagnoses:   1. Chronic right shoulder pain  Follow Up In Physical Therapy        Assessment:    Patient is 9 weeks post op still lacking shoulder mobility; focused on revisiting wand exercises in supine to stretch into flexion, abduction, and ER. Added weight to side lying alphabet and serratus punches for strengthening. Performed manual PROM to increase R shoulder flexibility and mobility with slight increases of ROM in all planes post treatment.    Plan:  Continue with use of wand for HEP ROM.    Subjective  Pt states he still has trouble sleeping at night because of the R shoulder pain.  - Pain (0-10): R shoulder 0 /10 pain; mostly soreness     Precautions  Fall Risk: no    Objective    Treatment Performed:   Therapeutic Exercise:  35 Minutes  - Access Code: [email or web address]   - UBE 3/3 L3 hills  - supine chicken wings (gentle) 20x  - black tubing bilateral shoulder rows, extension 20x each  - black tubing right shoulder IR 10x2  - black tubing right shoulder ER 10x2  - 1# DB raise and hold flexion and abduction 2 x 10 R/L  - door stretch low 15\"x 3  - door stretch high 15\" x 3  - side lying alphabet 3# x 1  - sidelying right shoulder blade protraction retraction at 90 degrees flexion x 20  - sidelying right shoulder open book 10x2  - supine SA punches 3# 20x  - supine wand flexion/abd/ER 5\" x 10 ea    Manual Therapy: 10 minutes  - R shoulder PROM  - STM/DTM R pec, subscap, UT, supraspinatus  - R GH jt mobs gr 1-2 posterior and inferior  - R GH anterior mobs with patient prone and TPR to posterior shoulder musculature   "

## 2024-04-01 ENCOUNTER — TREATMENT (OUTPATIENT)
Dept: PHYSICAL THERAPY | Facility: CLINIC | Age: 59
End: 2024-04-01
Payer: COMMERCIAL

## 2024-04-01 DIAGNOSIS — G89.29 CHRONIC RIGHT SHOULDER PAIN: ICD-10-CM

## 2024-04-01 DIAGNOSIS — M25.511 CHRONIC RIGHT SHOULDER PAIN: ICD-10-CM

## 2024-04-01 PROCEDURE — 97110 THERAPEUTIC EXERCISES: CPT | Mod: GP | Performed by: PHYSICAL THERAPIST

## 2024-04-01 PROCEDURE — 97140 MANUAL THERAPY 1/> REGIONS: CPT | Mod: GP | Performed by: PHYSICAL THERAPIST

## 2024-04-01 NOTE — PROGRESS NOTES
"PHYSICAL THERAPY   TREATMENT NOTE    Patient Name:  Rajiv Melvin   Patient MRN: [id number]  Date: 04/01/24    Time Calculation  Start Time: 0832  Stop Time: 0920  Time Calculation (min): 48 min    Insurance:  Insurance Type: [facility]  Visit number: 14  Approved # of visits 60  Authorization Needed: no    General   Reason for visit: s/p RTC & SAD   DOS 1/19/24   Referred by: Dr. Dunlap    Therapy diagnoses:   1. Chronic right shoulder pain  Follow Up In Physical Therapy        Assessment:    Pt progressing well with right shoulder AROM, strength, recovery of function, and pain control.  Pt demonstrates good scapulohumeral rhythm therefore progressed strength exercises.  Pt demonstrates a good understanding of which exercises to perform and avoid at the gym.  Tender and multiple trigger points to right posterior shoulder/scapular/neck musculature.      Plan:  Follow up regarding soft tissue manual work to posterior shoulder and returning to light gym strength for UE     Subjective  Pt states his shoulder is feeling pretty good.  Sleeping is getting better because he can more easily move it in all directions.  Still some discomfort to the top of his shoulder with some activities.  Using UBE at gym.    - Pain (0-10): R shoulder 0 /10 pain; mostly soreness     Precautions  Fall Risk: no    Objective    Treatment Performed:   Therapeutic Exercise:  24 Minutes  - Access Code: [email or web address]   - UBE 3/3 L3 hills  - push up at bar 15x  - child's pose stretch at bar 30\"  - triceps push down B 27.5 # 15x 2   - lat pull down bilateral 27.5# 15x  - DB curl to shoulder press B 3# 20x  - DB flexion and abduction B 2# 10x each, 3# 10x each  - education/discussion on returning to gym exercises (no pull ups or chin up, no regular push up, chest press only on machine)   - door stretch high 30\"   - discussion on theracane at home     Manual Therapy: 20 minutes  - R shoulder PROM  - R GH jt mobs gr 1-2 posterior and inferior  - RS at 90 degrees " "flexion R  30\" x 2   - STM/DTM R subscap, UT, levator scap, all RTC tendons, lats ( patient lying prone)       "

## 2024-04-08 ENCOUNTER — TREATMENT (OUTPATIENT)
Dept: PHYSICAL THERAPY | Facility: CLINIC | Age: 59
End: 2024-04-08
Payer: COMMERCIAL

## 2024-04-08 DIAGNOSIS — M25.511 CHRONIC RIGHT SHOULDER PAIN: Primary | ICD-10-CM

## 2024-04-08 DIAGNOSIS — G89.29 CHRONIC RIGHT SHOULDER PAIN: Primary | ICD-10-CM

## 2024-04-08 PROCEDURE — 97110 THERAPEUTIC EXERCISES: CPT | Mod: GP | Performed by: PHYSICAL THERAPIST

## 2024-04-08 NOTE — PROGRESS NOTES
PHYSICAL THERAPY   TREATMENT NOTE    Patient Name:  Rajiv Melvin   Patient MRN: [id number]  Date: 04/08/24    Time Calculation  Start Time: 0828  Stop Time: 0917  Time Calculation (min): 49 min    Insurance:  Insurance Type: [facility]  Visit number: 15  Approved # of visits 60  Authorization Needed: no    General   Reason for visit: s/p RTC & SAD   DOS 1/19/24   Referred by: Dr. Dunlap    Therapy diagnoses:   1. Chronic right shoulder pain  Follow Up In Physical Therapy        Assessment:    Pt is progressing good with R shoulder strength, ROM and pain. He continues to demonstrate difficulty with OH activities. Progressed weight with UE FLEX. He continues to demonstrate difficulty performing ABD, lowered DB weight to allow more ROM and good form. Incorporated IR exercise to address ROM deficit.  Pt tolerated all progressions w/o increases in pain. Educated Pt to incorporate OH exercises in HEP and continue progressing with 3# DB for UE FLEX and Abd as tolerated.     Plan: Progress UE strengthening incorporating OH exercises as tolerated. Continue addressing IR deficit. Manual therapy as needed.     Subjective  Pt notes his shoulder is not hurting and exercises have been going good. He notes he has been getting back to gym but is limited using lighter weights.   - Pain (0-10): R shoulder 0 /10 pain    Precautions  Fall Risk: no    Objective    Treatment Performed:   Therapeutic Exercise:  43 Minutes  - Access Code: [email or web address]   - UBE L4 hills x 4 fwd/3 bckwd  - Tricep curl at cable column 32.5 # x 3 sets 10  - Bicep curl at cable column 32.5# x 3 sets 10  - Rows at cable column 32.5# x 3 sets 10  - R UE ER/IR at cable column 7.5# x 3 sets 10  - Shoulder EXT at cable column 32.5# x 3 sets 10  - B UE FLEX to 90 w/ 5# x 3 sets 10  - B DB curl to shoulder press B 5# 3 sets 10  - B UE ABD to 90 w/ 3# x 3 sets 10  - Push up at barre x 30 reps  - Jhonny pose at barre x 3 sets 30 sec  - Bird dog w/ pulses x 2 reps 1 min  - R UE  hand up back w/ towel and pulley x 1 min    Manual Therapy: 6 minutes  - STM R UT, levator scap    Clementine Parra

## 2024-04-22 ENCOUNTER — TREATMENT (OUTPATIENT)
Dept: PHYSICAL THERAPY | Facility: CLINIC | Age: 59
End: 2024-04-22
Payer: COMMERCIAL

## 2024-04-22 DIAGNOSIS — G89.29 CHRONIC RIGHT SHOULDER PAIN: ICD-10-CM

## 2024-04-22 DIAGNOSIS — M25.511 CHRONIC RIGHT SHOULDER PAIN: ICD-10-CM

## 2024-04-22 PROCEDURE — 97110 THERAPEUTIC EXERCISES: CPT | Mod: GP,CQ

## 2024-04-22 NOTE — PROGRESS NOTES
PHYSICAL THERAPY   TREATMENT NOTE    Patient Name:  Rajiv Melvin   Patient MRN: [id number]  Date: 04/22/24    Time Calculation  Start Time: 1520  Stop Time: 1600  Time Calculation (min): 40 min    Insurance:  Insurance Type: [facility]  Visit number: 16  Approved # of visits 60  Authorization Needed: no    General   Reason for visit: s/p RTC & SAD   DOS 1/19/24   Referred by: Dr. Dunlap    Therapy diagnoses:   1. Chronic right shoulder pain  Follow Up In Physical Therapy          Assessment:    Pt presented to session with increased soreness in the R shoulder. Able to increase weight for cable column exercises for triceps extension and mid rows without issue. Verbal cues given to maintain neutral trunk for shoulder ER/IR in order to avoid compensations. Displayed proper push up technique on low plinth. Added medicine ball taps in overhead 1/2 rainbow to challenge muscular endurance. Declined manual this date.    Plan: Progress UE strengthening incorporating OH exercises as tolerated. Continue addressing IR deficit. Manual therapy PRN    Subjective  Pt notes his shoulder is still sore.  - Pain (0-10): R shoulder 0 /10 pain    Precautions  Fall Risk: no    Objective    Treatment Performed:   Therapeutic Exercise:  40 Minutes  - Access Code: [email or web address]   - UBE L4 hills x 4 fwd/3 bckwd  - Tricep curl at cable column 47.5 # x 3 sets 10  - Bicep curl at cable column 32.5# x 3 sets 10  - Rows at cable column 47.5# x 3 sets 10  - R UE ER/IR at cable column 7.5# x 3 sets 10  - Shoulder EXT at cable column 27.5# x 3 sets 10  - B UE FLEX to 90 w/ 8# x 3 sets 10  - B DB curl to shoulder press B 8# 3 sets 10  - B UE ABD to 90 w/ 8# x 3 sets 10  - Push up on low plinth x 30 reps  - Jhonny pose at barre x 3 sets 30 sec  - Bird dog w/ pulses x 2 reps 1 min  - R UE hand up back w/ towel and pulley x 1 min  - red med ball taps in 1/2 rainbow on wall L/R x 1 min    Manual Therapy:   minutes  - STM R UT, levator scap : declined

## 2024-04-29 ENCOUNTER — TREATMENT (OUTPATIENT)
Dept: PHYSICAL THERAPY | Facility: CLINIC | Age: 59
End: 2024-04-29
Payer: COMMERCIAL

## 2024-04-29 DIAGNOSIS — M25.511 CHRONIC RIGHT SHOULDER PAIN: ICD-10-CM

## 2024-04-29 DIAGNOSIS — G89.29 CHRONIC RIGHT SHOULDER PAIN: ICD-10-CM

## 2024-04-29 PROCEDURE — 97110 THERAPEUTIC EXERCISES: CPT | Mod: GP,CQ

## 2024-04-29 NOTE — PROGRESS NOTES
PHYSICAL THERAPY   TREATMENT NOTE    Patient Name:  Rajiv Melvin   Patient MRN: [id number]  Date: 04/29/24    Time Calculation  Start Time: 1605  Stop Time: 1650  Time Calculation (min): 45 min    Insurance:  Insurance Type: [facility]  Visit number: 17  Approved # of visits 60  Authorization Needed: no    General   Reason for visit: s/p RTC & SAD   DOS 1/19/24   Referred by: Dr. Dunlap    Therapy diagnoses:   1. Chronic right shoulder pain  Follow Up In Physical Therapy        Assessment:  Patient is 14 weeks and 3 days post op R shoulder RTC/SAD with soreness near the AC joint. Able to begin D1/D2 flexion/extension at the cable column with verbal cues for proper planes of motion without irritability. Performed push up progression to the floor and plank alternating shoulder taps on BOSU with good tolerance. Introduced rebounder soft toss with L UE using red medicine ball with review of proper technique. Moderate fatigue post session in the R UE.    Plan: Progress UE strengthening incorporating OH exercises as tolerated. Continue addressing IR deficit. Manual therapy PRN    Subjective  Pt states his shoulder is stiff and sore around the tip of the R shoulder.  - Pain (0-10): R shoulder 0 /10 pain    Precautions  Fall Risk: no    Objective    Treatment Performed:   Therapeutic Exercise:  45 Minutes  - Access Code: [email or web address]   - UBE L4 hills x 4 fwd/3 bckwd  - Jhonny pose at barre x 3 sets 30 sec  - Tricep curl at cable column 47.5 # x 3 sets 10  - Bicep curl at cable column 32.5# x 3 sets 10  - Rows at cable column 47.5# x 3 sets 10  - R UE ER/IR at cable column 7.5# x 3 sets 10  - Shoulder EXT at cable column 27.5# x 3 sets 10  - D1/D2 shoulder flexion/extension 7.5# 3 x 10  - push up on floor 10x  - BOSU plank with UE opposite taps 2 x 10  - soft toss at rebounder red ball 2 x 20  - B UE FLEX to 90 w/ 8# x 3 sets 10  - B DB curl to shoulder press B 8# 3 sets 10  - B UE ABD to 90 w/ 8# x 3 sets 10  - red med ball taps in  1/2 rainbow on wall and ER, L/R x 1 min    Not performed:  - Push up on low plinth x 30 reps  - Bird dog w/ pulses x 2 reps 1 min  - R UE hand up back w/ towel and pulley x 1 min    Manual Therapy:   minutes  - STM R UT, levator scap : declined

## 2024-05-06 ENCOUNTER — TREATMENT (OUTPATIENT)
Dept: PHYSICAL THERAPY | Facility: CLINIC | Age: 59
End: 2024-05-06
Payer: COMMERCIAL

## 2024-05-06 DIAGNOSIS — G89.29 CHRONIC RIGHT SHOULDER PAIN: ICD-10-CM

## 2024-05-06 DIAGNOSIS — M25.511 CHRONIC RIGHT SHOULDER PAIN: ICD-10-CM

## 2024-05-06 PROCEDURE — 97110 THERAPEUTIC EXERCISES: CPT | Mod: GP,CQ

## 2024-05-06 NOTE — PROGRESS NOTES
PHYSICAL THERAPY   TREATMENT NOTE    Patient Name:  Rajiv Melvin   Patient MRN: [id number]  Date: 05/06/24    Time Calculation  Start Time: 1130  Stop Time: 1215  Time Calculation (min): 45 min    Insurance:  Insurance Type: [facility]  Visit number: 18  Approved # of visits 60  Authorization Needed: no    General   Reason for visit: s/p RTC & SAD   DOS 1/19/24   Referred by: Dr. Dunlap    Therapy diagnoses:   1. Chronic right shoulder pain  Follow Up In Physical Therapy        Assessment:  Patient presented to session with continued stiffness within the R shoulder along with soreness depending on activity levels. Demonstrated better control of D1/D2 flex/ext at cable column. Challenged with endurance during addition of body blade in flexion/abduction at 90 degrees. Able to increase free weights and weight on cable column for biceps, RTC, and scapular strength without pain nor irritability post session.    Plan: Progress UE strengthening incorporating OH exercises as tolerated. Continue addressing IR deficit. Manual therapy PRN    Subjective  Pt states his R shoulder stiffness is still there and has soreness that depends on what he is doing. Doing well with gym work outs. Wants to be able to hang by his arms to do pull ups and golf. Has MD follow up Friday.  - Pain (0-10): R shoulder 0 /10 pain    Precautions  Fall Risk: no    Precautions  Fall Risk: no    Objective    Treatment Performed:   Therapeutic Exercise:  45 Minutes  - Access Code: [email or web address]   - UBE L4 hills x 3 fwd/3 bckwd  - IR with pulleys x 2 minutes  - Jhonny pose at barre x 3 sets 30 sec  - Tricep curl at cable column 57.5 # x 3 sets 10  - single arm bicep curl at cable column 17.5# x 3 sets 10  - Rows at cable column 57.5# x 3 sets 10  - R UE ER/IR at cable column 7.5# x 3 sets 10  - Shoulder EXT at cable column 57.5# x 3 sets 10  - D1/D2 shoulder flexion/extension 7.5# 3 x 10  - push up on floor 10x2  - BOSU plank with UE opposite taps 2 x 10  - soft  "toss at rebounder red ball 2 x 20  - B UE FLEX to 90 w/ 10# x 3 sets 10  - B DB curl to shoulder press B 10# 3 sets 10  - B UE ABD to 90 w/ 10# x 3 sets 10  - red med ball taps in 1/2 rainbow on wall and ER, L/R x 1 min  - body blade: 30\" x 2 flex/abd at 90    Not performed:  - Push up on low plinth x 30 reps  - Bird dog w/ pulses x 2 reps 1 min  - R UE hand up back w/ towel and pulley x 1 min    Manual Therapy:   minutes  - STM R UT, levator scap : declined    "

## 2024-05-10 ENCOUNTER — OFFICE VISIT (OUTPATIENT)
Dept: ORTHOPEDIC SURGERY | Facility: CLINIC | Age: 59
End: 2024-05-10
Payer: COMMERCIAL

## 2024-05-10 ENCOUNTER — HOSPITAL ENCOUNTER (OUTPATIENT)
Dept: RADIOLOGY | Facility: CLINIC | Age: 59
Discharge: HOME | End: 2024-05-10
Payer: COMMERCIAL

## 2024-05-10 VITALS — WEIGHT: 190 LBS | HEIGHT: 70 IN | BODY MASS INDEX: 27.2 KG/M2

## 2024-05-10 DIAGNOSIS — M25.511 CHRONIC RIGHT SHOULDER PAIN: Primary | ICD-10-CM

## 2024-05-10 DIAGNOSIS — G89.29 CHRONIC RIGHT SHOULDER PAIN: Primary | ICD-10-CM

## 2024-05-10 DIAGNOSIS — M25.511 RIGHT SHOULDER PAIN, UNSPECIFIED CHRONICITY: ICD-10-CM

## 2024-05-10 PROCEDURE — 1036F TOBACCO NON-USER: CPT | Performed by: ORTHOPAEDIC SURGERY

## 2024-05-10 PROCEDURE — 73030 X-RAY EXAM OF SHOULDER: CPT | Mod: RT

## 2024-05-10 PROCEDURE — 99213 OFFICE O/P EST LOW 20 MIN: CPT | Performed by: ORTHOPAEDIC SURGERY

## 2024-05-10 PROCEDURE — 3008F BODY MASS INDEX DOCD: CPT | Performed by: ORTHOPAEDIC SURGERY

## 2024-05-10 PROCEDURE — 73030 X-RAY EXAM OF SHOULDER: CPT | Mod: RIGHT SIDE | Performed by: RADIOLOGY

## 2024-05-10 NOTE — PROGRESS NOTES
58-year-old is seen following right shoulder arthroscopy with rotator cuff repair on January 19, 2024.  He has progressed well in physical therapy.  He has improved from his preoperative symptoms.  He is satisfied with his progress.    Pleasant and no acute distress.  Right shoulder active forward flexion 170 degrees and external rotation 35 degrees.  There is good forward flexion and internal/external rotation strength.  Minimal tenderness.    Multiple x-ray views of the right shoulder are personally reviewed and there is a good subacromial decompression.    A discussion about his shoulder was done.  He has progressed very well.  Precautions reviewed.  He will slowly progress activities over the next month.  He like to return to golf and pickleball and if he continues to progress over the next month he can then return to these activities.  He can use Tylenol or an NSAID.  Follow-up if symptomatic in 2 to 3 months.

## 2024-06-14 ENCOUNTER — DOCUMENTATION (OUTPATIENT)
Dept: PHYSICAL THERAPY | Facility: CLINIC | Age: 59
End: 2024-06-14
Payer: COMMERCIAL

## 2024-06-14 NOTE — PROGRESS NOTES
Physical Therapy    Discharge Summary    Name: Rajiv Melvin  MRN: [id number]  : 1965  Date: 24    Discharge Summary: PT    Discharge Information:   Date of discharge 24  Date of last visit 24  Date of evaluation 24  Number of attended visits 18  Referred by Germán  Referred for RTC and SAD 24    Rehab Discharge Reason:  Lost to follow up - at time of last visit was progressing nicely and ortho had discharged him to return to golf and pickleball as able.

## 2024-08-30 DIAGNOSIS — I25.119 CORONARY ARTERY DISEASE WITH ANGINA PECTORIS, UNSPECIFIED VESSEL OR LESION TYPE, UNSPECIFIED WHETHER NATIVE OR TRANSPLANTED HEART (CMS-HCC): ICD-10-CM

## 2024-08-30 DIAGNOSIS — E78.41 ELEVATED LIPOPROTEIN(A): ICD-10-CM

## 2024-08-30 RX ORDER — ROSUVASTATIN CALCIUM 40 MG/1
40 TABLET, COATED ORAL DAILY
Qty: 90 TABLET | Refills: 3 | Status: SHIPPED | OUTPATIENT
Start: 2024-08-30

## 2024-08-30 RX ORDER — EZETIMIBE 10 MG/1
10 TABLET ORAL DAILY
Qty: 90 TABLET | Refills: 3 | Status: SHIPPED | OUTPATIENT
Start: 2024-08-30

## 2024-08-30 RX ORDER — ASPIRIN 81 MG/1
81 TABLET ORAL DAILY
Qty: 90 TABLET | Refills: 3 | Status: SHIPPED | OUTPATIENT
Start: 2024-08-30

## 2024-09-20 DIAGNOSIS — H91.93 BILATERAL HEARING LOSS, UNSPECIFIED HEARING LOSS TYPE: Primary | ICD-10-CM

## 2024-12-03 DIAGNOSIS — E78.41 ELEVATED LIPOPROTEIN(A): ICD-10-CM

## 2024-12-03 RX ORDER — ROSUVASTATIN CALCIUM 40 MG/1
40 TABLET, COATED ORAL DAILY
Qty: 90 TABLET | Refills: 3 | Status: SHIPPED | OUTPATIENT
Start: 2024-12-03

## 2025-02-17 DIAGNOSIS — E78.41 ELEVATED LIPOPROTEIN(A): ICD-10-CM

## 2025-02-17 RX ORDER — ROSUVASTATIN CALCIUM 40 MG/1
40 TABLET, COATED ORAL DAILY
Qty: 90 TABLET | Refills: 3 | Status: SHIPPED | OUTPATIENT
Start: 2025-02-17

## 2025-03-13 ASSESSMENT — PROMIS GLOBAL HEALTH SCALE
EMOTIONAL_PROBLEMS: RARELY
RATE_SOCIAL_SATISFACTION: VERY GOOD
RATE_MENTAL_HEALTH: VERY GOOD
RATE_PHYSICAL_HEALTH: VERY GOOD
RATE_AVERAGE_PAIN: 0
RATE_QUALITY_OF_LIFE: VERY GOOD
CARRYOUT_PHYSICAL_ACTIVITIES: COMPLETELY
RATE_GENERAL_HEALTH: VERY GOOD
CARRYOUT_SOCIAL_ACTIVITIES: VERY GOOD

## 2025-03-14 ENCOUNTER — APPOINTMENT (OUTPATIENT)
Dept: PRIMARY CARE | Facility: CLINIC | Age: 60
End: 2025-03-14
Payer: COMMERCIAL

## 2025-03-14 VITALS
TEMPERATURE: 98.7 F | HEART RATE: 58 BPM | OXYGEN SATURATION: 95 % | DIASTOLIC BLOOD PRESSURE: 77 MMHG | SYSTOLIC BLOOD PRESSURE: 138 MMHG | BODY MASS INDEX: 29.32 KG/M2 | HEIGHT: 70 IN | WEIGHT: 204.8 LBS

## 2025-03-14 DIAGNOSIS — E78.5 HYPERLIPIDEMIA, UNSPECIFIED HYPERLIPIDEMIA TYPE: ICD-10-CM

## 2025-03-14 DIAGNOSIS — Z00.00 WELL ADULT EXAM: Primary | ICD-10-CM

## 2025-03-14 DIAGNOSIS — I25.119 CORONARY ARTERY DISEASE WITH ANGINA PECTORIS, UNSPECIFIED VESSEL OR LESION TYPE, UNSPECIFIED WHETHER NATIVE OR TRANSPLANTED HEART: ICD-10-CM

## 2025-03-14 PROCEDURE — 99396 PREV VISIT EST AGE 40-64: CPT | Performed by: FAMILY MEDICINE

## 2025-03-14 PROCEDURE — 3008F BODY MASS INDEX DOCD: CPT | Performed by: FAMILY MEDICINE

## 2025-03-14 PROCEDURE — 1036F TOBACCO NON-USER: CPT | Performed by: FAMILY MEDICINE

## 2025-03-14 ASSESSMENT — ENCOUNTER SYMPTOMS
CARDIOVASCULAR NEGATIVE: 1
ENDOCRINE NEGATIVE: 1
PSYCHIATRIC NEGATIVE: 1
RESPIRATORY NEGATIVE: 1
MUSCULOSKELETAL NEGATIVE: 1
DEPRESSION: 0
NEUROLOGICAL NEGATIVE: 1
LOSS OF SENSATION IN FEET: 0
CONSTITUTIONAL NEGATIVE: 1
OCCASIONAL FEELINGS OF UNSTEADINESS: 0
GASTROINTESTINAL NEGATIVE: 1

## 2025-03-14 ASSESSMENT — PATIENT HEALTH QUESTIONNAIRE - PHQ9
2. FEELING DOWN, DEPRESSED OR HOPELESS: NOT AT ALL
1. LITTLE INTEREST OR PLEASURE IN DOING THINGS: NOT AT ALL
SUM OF ALL RESPONSES TO PHQ9 QUESTIONS 1 AND 2: 0

## 2025-03-14 ASSESSMENT — PAIN SCALES - GENERAL: PAINLEVEL_OUTOF10: 0-NO PAIN

## 2025-03-14 NOTE — PROGRESS NOTES
"Subjective   Patient ID: Rajiv Melvin is a 59 y.o. male who presents for Annual Exam (Annual cpe fasting bw).    HPI     Review of Systems   Constitutional: Negative.    HENT: Negative.     Respiratory: Negative.     Cardiovascular: Negative.    Gastrointestinal: Negative.    Endocrine: Negative.    Genitourinary: Negative.    Musculoskeletal: Negative.    Neurological: Negative.    Psychiatric/Behavioral: Negative.         Objective   /77 (BP Location: Left arm)   Pulse 58   Temp 37.1 °C (98.7 °F) (Oral)   Ht 1.778 m (5' 10\")   Wt 92.9 kg (204 lb 12.8 oz)   SpO2 95%   BMI 29.39 kg/m²     Physical Exam  Vitals and nursing note reviewed.   Constitutional:       Appearance: Normal appearance.   HENT:      Right Ear: Tympanic membrane normal.      Left Ear: Tympanic membrane normal.      Mouth/Throat:      Pharynx: Oropharynx is clear.   Cardiovascular:      Rate and Rhythm: Normal rate and regular rhythm.      Pulses: Normal pulses.      Heart sounds: Normal heart sounds.   Pulmonary:      Breath sounds: Normal breath sounds.   Abdominal:      Palpations: Abdomen is soft.   Musculoskeletal:         General: Normal range of motion.   Neurological:      General: No focal deficit present.      Mental Status: He is alert and oriented to person, place, and time.   Psychiatric:         Mood and Affect: Mood normal.         Behavior: Behavior normal.         Assessment/Plan patient seen here for a annual physical.  He is doing well we are drawing his lab work here today he is on the same medications.  Will see him back in a year  Problem List Items Addressed This Visit             ICD-10-CM    CAD (coronary artery disease) I25.10    Hyperlipidemia E78.5     Other Visit Diagnoses         Codes    Well adult exam    -  Primary Z00.00    Relevant Orders    Prostate Specific Antigen    Lipid Panel    CBC and Auto Differential    Comprehensive Metabolic Panel    BMI 29.0-29.9,adult     Z68.29               "

## 2025-03-15 LAB
ALBUMIN SERPL-MCNC: 4.5 G/DL (ref 3.6–5.1)
ALP SERPL-CCNC: 46 U/L (ref 35–144)
ALT SERPL-CCNC: 36 U/L (ref 9–46)
ANION GAP SERPL CALCULATED.4IONS-SCNC: 9 MMOL/L (CALC) (ref 7–17)
AST SERPL-CCNC: 22 U/L (ref 10–35)
BASOPHILS # BLD AUTO: 19 CELLS/UL (ref 0–200)
BASOPHILS NFR BLD AUTO: 0.3 %
BILIRUB SERPL-MCNC: 0.7 MG/DL (ref 0.2–1.2)
BUN SERPL-MCNC: 16 MG/DL (ref 7–25)
CALCIUM SERPL-MCNC: 9.7 MG/DL (ref 8.6–10.3)
CHLORIDE SERPL-SCNC: 106 MMOL/L (ref 98–110)
CHOLEST SERPL-MCNC: 143 MG/DL
CHOLEST/HDLC SERPL: 3 (CALC)
CO2 SERPL-SCNC: 25 MMOL/L (ref 20–32)
CREAT SERPL-MCNC: 1.07 MG/DL (ref 0.7–1.35)
EGFRCR SERPLBLD CKD-EPI 2021: 79 ML/MIN/1.73M2
EOSINOPHIL # BLD AUTO: 87 CELLS/UL (ref 15–500)
EOSINOPHIL NFR BLD AUTO: 1.4 %
ERYTHROCYTE [DISTWIDTH] IN BLOOD BY AUTOMATED COUNT: 13 % (ref 11–15)
GLUCOSE SERPL-MCNC: 156 MG/DL (ref 65–99)
HCT VFR BLD AUTO: 43.4 % (ref 38.5–50)
HDLC SERPL-MCNC: 47 MG/DL
HGB BLD-MCNC: 14.3 G/DL (ref 13.2–17.1)
LDLC SERPL CALC-MCNC: 68 MG/DL (CALC)
LYMPHOCYTES # BLD AUTO: 2486 CELLS/UL (ref 850–3900)
LYMPHOCYTES NFR BLD AUTO: 40.1 %
MCH RBC QN AUTO: 29.2 PG (ref 27–33)
MCHC RBC AUTO-ENTMCNC: 32.9 G/DL (ref 32–36)
MCV RBC AUTO: 88.8 FL (ref 80–100)
MONOCYTES # BLD AUTO: 527 CELLS/UL (ref 200–950)
MONOCYTES NFR BLD AUTO: 8.5 %
NEUTROPHILS # BLD AUTO: 3081 CELLS/UL (ref 1500–7800)
NEUTROPHILS NFR BLD AUTO: 49.7 %
NONHDLC SERPL-MCNC: 96 MG/DL (CALC)
PLATELET # BLD AUTO: 214 THOUSAND/UL (ref 140–400)
PMV BLD REES-ECKER: 9.5 FL (ref 7.5–12.5)
POTASSIUM SERPL-SCNC: 5 MMOL/L (ref 3.5–5.3)
PROT SERPL-MCNC: 6.8 G/DL (ref 6.1–8.1)
PSA SERPL-MCNC: 2.21 NG/ML
RBC # BLD AUTO: 4.89 MILLION/UL (ref 4.2–5.8)
SODIUM SERPL-SCNC: 140 MMOL/L (ref 135–146)
TRIGL SERPL-MCNC: 224 MG/DL
WBC # BLD AUTO: 6.2 THOUSAND/UL (ref 3.8–10.8)

## 2025-03-18 DIAGNOSIS — E66.3 OVERWEIGHT WITH BODY MASS INDEX (BMI) OF 28 TO 28.9 IN ADULT: Primary | ICD-10-CM

## 2025-03-21 LAB
EST. AVERAGE GLUCOSE BLD GHB EST-MCNC: 163 MG/DL
EST. AVERAGE GLUCOSE BLD GHB EST-SCNC: 9 MMOL/L
HBA1C MFR BLD: 7.3 % OF TOTAL HGB

## 2025-06-23 DIAGNOSIS — R73.9 HYPERGLYCEMIA: Primary | ICD-10-CM

## 2025-06-27 LAB
EST. AVERAGE GLUCOSE BLD GHB EST-MCNC: 128 MG/DL
EST. AVERAGE GLUCOSE BLD GHB EST-SCNC: 7.1 MMOL/L
HBA1C MFR BLD: 6.1 %

## 2025-08-13 DIAGNOSIS — E78.41 ELEVATED LIPOPROTEIN(A): ICD-10-CM

## 2025-08-13 DIAGNOSIS — I25.119 CORONARY ARTERY DISEASE WITH ANGINA PECTORIS, UNSPECIFIED VESSEL OR LESION TYPE, UNSPECIFIED WHETHER NATIVE OR TRANSPLANTED HEART: ICD-10-CM

## 2025-08-13 RX ORDER — EZETIMIBE 10 MG/1
10 TABLET ORAL DAILY
Qty: 90 TABLET | Refills: 3 | Status: SHIPPED | OUTPATIENT
Start: 2025-08-13

## 2025-08-13 RX ORDER — ASPIRIN 81 MG/1
81 TABLET ORAL DAILY
Qty: 90 TABLET | Refills: 3 | Status: SHIPPED | OUTPATIENT
Start: 2025-08-13

## 2026-03-16 ENCOUNTER — APPOINTMENT (OUTPATIENT)
Dept: PRIMARY CARE | Facility: CLINIC | Age: 61
End: 2026-03-16
Payer: COMMERCIAL

## (undated) DEVICE — DRESSING, ABDOMINAL, TENDERSORB, 8 X 7-1/2 IN, STERILE

## (undated) DEVICE — PROTECTOR, HEEL/ANKLE/ELBOW, UNIVERSAL

## (undated) DEVICE — NEEDLE, SCORPION MULTIFIRE

## (undated) DEVICE — BLADE, STRYKER, 5.5MM RESECTOR, F-SERIES

## (undated) DEVICE — TUBING, PUMP MAIN 16FT STERILE

## (undated) DEVICE — ADAPTER, Y TUBING STERILE

## (undated) DEVICE — BLADE, STRYKER, 4.0MM, RESECTOR, F-SERIES

## (undated) DEVICE — PROBE, APOLLO RF, 90 DEG, EXTRA LARTGE

## (undated) DEVICE — NEEDLE, SPINAL, QUINCKE, 18 G X 3.5 IN, PINK HUB

## (undated) DEVICE — SLEEVE, VASO PRESS, CALF GARMENT, MEDIUM, GREEN

## (undated) DEVICE — CONNECTOR, 5 IN 1, STERILE

## (undated) DEVICE — APPLICATOR, CHLORAPREP, W/ORANGE TINT, 26ML

## (undated) DEVICE — DRAPE, TIBURON, SPLIT SHEET, REINF ADH STRIP, 77X108

## (undated) DEVICE — DRESSING, GAUZE, PETROLATUM, PATCH, XEROFORM, 1 X 8 IN, STERILE

## (undated) DEVICE — BURR, STRYKER, 5.5MM, BRL 6FLT

## (undated) DEVICE — DRAPE, INCISE, ANTIMICROBIAL, IOBAN 2, STERI DRAPE, 23 X 33 IN, DISPOSABLE, STERILE

## (undated) DEVICE — DRAPE, INSTRUMENT, W/POUCH, STERI DRAPE, 7 X 11 IN, DISPOSABLE, STERILE

## (undated) DEVICE — CANNULA, INSTRUMENT, 7MM ID X 7CM

## (undated) DEVICE — TUBING, CLEAR N-COND, 5MM X 10, LF

## (undated) DEVICE — DRESSING, GAUZE, SPONGE, 12 PLY, CURITY, 4 X 4 IN, RIGID TRAY, STERILE

## (undated) DEVICE — DRAPE, U-DRAPE, NON STERILE

## (undated) DEVICE — SLEEVE, ARM, LAERAL TRACTION

## (undated) DEVICE — DRAPE, SHEET, U, W/ADHESIVE STRIP, IMPERVIOUS, 60 X 70 IN, DISPOSABLE, LF, STERILE

## (undated) DEVICE — Device

## (undated) DEVICE — CANNULA, BUTTON, PASSPORT, 8MM X 4CM